# Patient Record
Sex: FEMALE | Race: WHITE | Employment: OTHER | URBAN - METROPOLITAN AREA
[De-identification: names, ages, dates, MRNs, and addresses within clinical notes are randomized per-mention and may not be internally consistent; named-entity substitution may affect disease eponyms.]

---

## 2017-11-16 ENCOUNTER — OFFICE VISIT (OUTPATIENT)
Dept: DERMATOLOGY | Age: 62
End: 2017-11-16

## 2017-11-16 DIAGNOSIS — L56.5 DISSEMINATED SUPERFICIAL ACTINIC POROKERATOSIS (DSAP): ICD-10-CM

## 2017-11-16 DIAGNOSIS — D22.9 MULTIPLE NEVI: Primary | ICD-10-CM

## 2017-11-16 PROCEDURE — 99213 OFFICE O/P EST LOW 20 MIN: CPT | Performed by: DERMATOLOGY

## 2017-11-16 RX ORDER — LANOLIN ALCOHOL/MO/W.PET/CERES
3 CREAM (GRAM) TOPICAL NIGHTLY
COMMUNITY
End: 2020-06-22

## 2017-11-16 NOTE — PROGRESS NOTES
[facility] Dermatology  Laura Ruiz MD  [address] [facility]  1955    60 y.o. female     Date of Visit: 2017    Chief Complaint: skin moles    History of Present Illness:    1. She has multiple nevi - not aware of any changes in size, color or shape. 2.  F/u for disseminated superficial actinic porokeratosis of the legs - remains stable and asymptomatic. Review of Systems:  Skin: no rash or itching. Past Medical History, Family History, Surgical History, Medications and Allergies reviewed. Past Medical History:   Diagnosis Date    Hypertension      Past Surgical History:   Procedure Laterality Date    APPENDECTOMY         Allergies   Allergen Reactions    Penicillins     Sulfa Antibiotics      Outpatient Prescriptions Marked as Taking for the 17 encounter (Office Visit) with Mary Newman MD   Medication Sig Dispense Refill    melatonin 3 MG TABS tablet Take 3 mg by mouth daily      Probiotic Product (PROBIOTIC PO) Take by mouth      Milk Thistle 1000 MG CAPS Take by mouth      lisinopril (PRINIVIL;ZESTRIL) 10 MG tablet       Multiple Vitamins-Minerals (THERAPEUTIC MULTIVITAMIN-MINERALS) tablet Take 1 tablet by mouth daily.  Cholecalciferol (VITAMIN D PO) Take  by mouth.  calcium carbonate (OSCAL) 500 MG TABS tablet Take 500 mg by mouth daily.  Omega-3 Fatty Acids (FISH OIL PO) Take  by mouth. Physical Examination       The following were examined and determined to be normal: Psych/Neuro, Scalp/hair, Head/face, Conjunctivae/eyelids, Gums/teeth/lips, Neck, Breast/axilla/chest, Abdomen, Back, RUE, LUE and Nails/digits. The following were examined and determined to be abnormal: RLE and LLE. Well appearing. 1.  Trunk and extremities with multiple well defined round to oval smooth brown macules and papules. 2.  Legs with round pink macules with a thin keratotic rim. Assessment and Plan     1. Multiple nevi - benign appearing    She was encouraged to perform monthly self skin exams and to call with any new or concerning lesions. Sun protective behaviors were encouraged. Follow up for full skin exam in 1 year. 2. Disseminated superficial actinic porokeratosis (DSAP) - stable    Encouraged sun protective behaviors. Return in about 1 year (around 11/16/2018).

## 2019-01-03 ENCOUNTER — OFFICE VISIT (OUTPATIENT)
Dept: DERMATOLOGY | Age: 64
End: 2019-01-03
Payer: COMMERCIAL

## 2019-01-03 DIAGNOSIS — L56.5 DISSEMINATED SUPERFICIAL ACTINIC POROKERATOSIS (DSAP): ICD-10-CM

## 2019-01-03 DIAGNOSIS — D22.9 MULTIPLE BENIGN NEVI: Primary | ICD-10-CM

## 2019-01-03 DIAGNOSIS — L70.9 ADULT ACNE: ICD-10-CM

## 2019-01-03 PROCEDURE — 99213 OFFICE O/P EST LOW 20 MIN: CPT | Performed by: DERMATOLOGY

## 2019-01-03 RX ORDER — CLINDAMYCIN PHOSPHATE AND BENZOYL PEROXIDE 10; 50 MG/G; MG/G
GEL TOPICAL
Qty: 45 G | Refills: 3 | Status: SHIPPED | OUTPATIENT
Start: 2019-01-03 | End: 2019-10-07

## 2019-10-07 ENCOUNTER — OFFICE VISIT (OUTPATIENT)
Dept: DERMATOLOGY | Age: 64
End: 2019-10-07
Payer: MEDICARE

## 2019-10-07 DIAGNOSIS — L81.4 SOLAR LENTIGO: ICD-10-CM

## 2019-10-07 DIAGNOSIS — D48.5 NEOPLASM OF UNCERTAIN BEHAVIOR OF SKIN: ICD-10-CM

## 2019-10-07 DIAGNOSIS — D22.9 MULTIPLE NEVI: Primary | ICD-10-CM

## 2019-10-07 PROCEDURE — 4040F PNEUMOC VAC/ADMIN/RCVD: CPT | Performed by: DERMATOLOGY

## 2019-10-07 PROCEDURE — G8421 BMI NOT CALCULATED: HCPCS | Performed by: DERMATOLOGY

## 2019-10-07 PROCEDURE — 11102 TANGNTL BX SKIN SINGLE LES: CPT | Performed by: DERMATOLOGY

## 2019-10-07 PROCEDURE — 1123F ACP DISCUSS/DSCN MKR DOCD: CPT | Performed by: DERMATOLOGY

## 2019-10-07 PROCEDURE — 3017F COLORECTAL CA SCREEN DOC REV: CPT | Performed by: DERMATOLOGY

## 2019-10-07 PROCEDURE — 99213 OFFICE O/P EST LOW 20 MIN: CPT | Performed by: DERMATOLOGY

## 2019-10-07 PROCEDURE — G8484 FLU IMMUNIZE NO ADMIN: HCPCS | Performed by: DERMATOLOGY

## 2019-10-07 PROCEDURE — 1090F PRES/ABSN URINE INCON ASSESS: CPT | Performed by: DERMATOLOGY

## 2019-10-07 PROCEDURE — G8400 PT W/DXA NO RESULTS DOC: HCPCS | Performed by: DERMATOLOGY

## 2019-10-07 PROCEDURE — G8427 DOCREV CUR MEDS BY ELIG CLIN: HCPCS | Performed by: DERMATOLOGY

## 2019-10-07 PROCEDURE — 1036F TOBACCO NON-USER: CPT | Performed by: DERMATOLOGY

## 2019-10-09 LAB — DERMATOLOGY PATHOLOGY REPORT: NORMAL

## 2020-06-22 ENCOUNTER — ANESTHESIA EVENT (OUTPATIENT)
Dept: OPERATING ROOM | Age: 65
End: 2020-06-22
Payer: MEDICARE

## 2020-06-22 ENCOUNTER — OFFICE VISIT (OUTPATIENT)
Dept: ORTHOPEDIC SURGERY | Age: 65
End: 2020-06-22
Payer: MEDICARE

## 2020-06-22 VITALS — BODY MASS INDEX: 22.86 KG/M2 | WEIGHT: 129 LBS | HEIGHT: 63 IN

## 2020-06-22 PROCEDURE — G8420 CALC BMI NORM PARAMETERS: HCPCS | Performed by: ORTHOPAEDIC SURGERY

## 2020-06-22 PROCEDURE — 99203 OFFICE O/P NEW LOW 30 MIN: CPT | Performed by: ORTHOPAEDIC SURGERY

## 2020-06-22 PROCEDURE — 1036F TOBACCO NON-USER: CPT | Performed by: ORTHOPAEDIC SURGERY

## 2020-06-22 PROCEDURE — 1090F PRES/ABSN URINE INCON ASSESS: CPT | Performed by: ORTHOPAEDIC SURGERY

## 2020-06-22 PROCEDURE — G8400 PT W/DXA NO RESULTS DOC: HCPCS | Performed by: ORTHOPAEDIC SURGERY

## 2020-06-22 PROCEDURE — G8427 DOCREV CUR MEDS BY ELIG CLIN: HCPCS | Performed by: ORTHOPAEDIC SURGERY

## 2020-06-22 PROCEDURE — 1123F ACP DISCUSS/DSCN MKR DOCD: CPT | Performed by: ORTHOPAEDIC SURGERY

## 2020-06-22 PROCEDURE — 4040F PNEUMOC VAC/ADMIN/RCVD: CPT | Performed by: ORTHOPAEDIC SURGERY

## 2020-06-22 PROCEDURE — 3017F COLORECTAL CA SCREEN DOC REV: CPT | Performed by: ORTHOPAEDIC SURGERY

## 2020-06-22 RX ORDER — LISINOPRIL AND HYDROCHLOROTHIAZIDE 20; 12.5 MG/1; MG/1
1 TABLET ORAL DAILY
COMMUNITY

## 2020-06-22 RX ORDER — PHENOL 1.4 %
AEROSOL, SPRAY (ML) MUCOUS MEMBRANE NIGHTLY
COMMUNITY

## 2020-06-22 RX ORDER — OXYCODONE HYDROCHLORIDE AND ACETAMINOPHEN 5; 325 MG/1; MG/1
1 TABLET ORAL EVERY 6 HOURS PRN
Qty: 28 TABLET | Refills: 0 | Status: SHIPPED | OUTPATIENT
Start: 2020-06-22 | End: 2020-06-29

## 2020-06-22 NOTE — PROGRESS NOTES
500 mg by mouth daily.  Omega-3 Fatty Acids (FISH OIL PO) Take  by mouth. No current facility-administered medications on file prior to visit. Past Medical History:   Diagnosis Date    Hypertension      Allergies   Allergen Reactions    Penicillins     Sulfa Antibiotics      Social History     Socioeconomic History    Marital status: Single     Spouse name: Not on file    Number of children: Not on file    Years of education: Not on file    Highest education level: Not on file   Occupational History    Not on file   Social Needs    Financial resource strain: Not on file    Food insecurity     Worry: Not on file     Inability: Not on file    Transportation needs     Medical: Not on file     Non-medical: Not on file   Tobacco Use    Smoking status: Never Smoker    Smokeless tobacco: Never Used   Substance and Sexual Activity    Alcohol use: Yes     Comment: social    Drug use: No    Sexual activity: Not on file   Lifestyle    Physical activity     Days per week: Not on file     Minutes per session: Not on file    Stress: Not on file   Relationships    Social connections     Talks on phone: Not on file     Gets together: Not on file     Attends Spiritism service: Not on file     Active member of club or organization: Not on file     Attends meetings of clubs or organizations: Not on file     Relationship status: Not on file    Intimate partner violence     Fear of current or ex partner: Not on file     Emotionally abused: Not on file     Physically abused: Not on file     Forced sexual activity: Not on file   Other Topics Concern    Not on file   Social History Narrative    Not on file     No family history on file. Patient's medications, allergies, past medical, surgical, social and family histories were reviewed and updated as appropriate.     Review of Systems  Pertinent items are noted in HPI  Denies fever chills, confusion and bowel and bladder active change  Complete Review

## 2020-06-22 NOTE — PROGRESS NOTES
Delmi Marjorie    Age 76 y.o.    female    1955    [facility] [id number]    6/23/2020  Arrival Time_____________  OR Time____________[address]     Procedure(s):  OPEN REDUCTION INTERNAL FIXATION LEFT DISTAL RADIUS FRACTURE -BLOCK-  LEFT OPEN CARPAL TUNNEL RELEASE                      General    Surgeon(s):  Josselin Cronin MD       Phone [phone] ([place])     [address]  Cell Work  _________________________________________________________________  _________________________________________________________________  _________________________________________________________________  _________________________________________________________________  _________________________________________________________________      PCP _____________________________ Phone_________________       H&P__________________Bringing    Chart            Epic  DOS     Called_______  EKG__________________Bringing    Chart            Epic  DOS     Called_______  LAB__________________ Bringing    Chart            Epic  DOS     Called_______  Cardiac Clearance_______Bringing    Chart            Epic      DOS       Called_______    Cardiologist________________________ Phone___________________________      ? Taoist concerns / Waiver on Chart            PAT Communications_____________  ? Pre-op Instructions Given [place]          ______________________________  ? Directions to Surgery Center                          ______________________________  ? Transportation Home_______________      _______________________________  ?  Crutches/Walker__________________        _______________________________      ________Pre-op Orders   _______Transcribed    _______Wt.  ________Pharmacy          _______SCD  ______VTE     ______Beta Blocker  ________Consent             ________TED Audelia Piper

## 2020-06-23 ENCOUNTER — ANESTHESIA (OUTPATIENT)
Dept: OPERATING ROOM | Age: 65
End: 2020-06-23
Payer: MEDICARE

## 2020-06-23 ENCOUNTER — HOSPITAL ENCOUNTER (OUTPATIENT)
Age: 65
Setting detail: OUTPATIENT SURGERY
Discharge: HOME | End: 2020-06-23
Attending: ORTHOPAEDIC SURGERY | Admitting: ORTHOPAEDIC SURGERY
Payer: MEDICARE

## 2020-06-23 VITALS
DIASTOLIC BLOOD PRESSURE: 74 MMHG | SYSTOLIC BLOOD PRESSURE: 118 MMHG | RESPIRATION RATE: 5 BRPM | OXYGEN SATURATION: 98 %

## 2020-06-23 VITALS
HEIGHT: 63 IN | TEMPERATURE: 97 F | OXYGEN SATURATION: 99 % | DIASTOLIC BLOOD PRESSURE: 80 MMHG | WEIGHT: 129 LBS | HEART RATE: 60 BPM | BODY MASS INDEX: 22.86 KG/M2 | SYSTOLIC BLOOD PRESSURE: 146 MMHG | RESPIRATION RATE: 16 BRPM

## 2020-06-23 PROBLEM — G56.02 LEFT CARPAL TUNNEL SYNDROME: Status: ACTIVE | Noted: 2020-06-23

## 2020-06-23 PROBLEM — S52.552A CLOSED EXTRAARTICULAR FRACTURE OF DISTAL RADIUS, LEFT, INITIAL ENCOUNTER: Status: ACTIVE | Noted: 2020-06-23

## 2020-06-23 PROCEDURE — 7100000000 HC PACU RECOVERY - FIRST 15 MIN: Performed by: ORTHOPAEDIC SURGERY

## 2020-06-23 PROCEDURE — 3700000001 HC ADD 15 MINUTES (ANESTHESIA): Performed by: ORTHOPAEDIC SURGERY

## 2020-06-23 PROCEDURE — 2580000003 HC RX 258: Performed by: ORTHOPAEDIC SURGERY

## 2020-06-23 PROCEDURE — 2580000003 HC RX 258: Performed by: ANESTHESIOLOGY

## 2020-06-23 PROCEDURE — 3600000014 HC SURGERY LEVEL 4 ADDTL 15MIN: Performed by: ORTHOPAEDIC SURGERY

## 2020-06-23 PROCEDURE — 6360000002 HC RX W HCPCS: Performed by: ANESTHESIOLOGY

## 2020-06-23 PROCEDURE — 6360000002 HC RX W HCPCS: Performed by: NURSE ANESTHETIST, CERTIFIED REGISTERED

## 2020-06-23 PROCEDURE — 2709999900 HC NON-CHARGEABLE SUPPLY: Performed by: ORTHOPAEDIC SURGERY

## 2020-06-23 PROCEDURE — 64415 NJX AA&/STRD BRCH PLXS IMG: CPT | Performed by: ANESTHESIOLOGY

## 2020-06-23 PROCEDURE — 7100000010 HC PHASE II RECOVERY - FIRST 15 MIN: Performed by: ORTHOPAEDIC SURGERY

## 2020-06-23 PROCEDURE — 2500000003 HC RX 250 WO HCPCS: Performed by: ANESTHESIOLOGY

## 2020-06-23 PROCEDURE — 3600000004 HC SURGERY LEVEL 4 BASE: Performed by: ORTHOPAEDIC SURGERY

## 2020-06-23 PROCEDURE — 6370000000 HC RX 637 (ALT 250 FOR IP): Performed by: ANESTHESIOLOGY

## 2020-06-23 PROCEDURE — 2500000003 HC RX 250 WO HCPCS: Performed by: ORTHOPAEDIC SURGERY

## 2020-06-23 PROCEDURE — 3700000000 HC ANESTHESIA ATTENDED CARE: Performed by: ORTHOPAEDIC SURGERY

## 2020-06-23 PROCEDURE — 7100000001 HC PACU RECOVERY - ADDTL 15 MIN: Performed by: ORTHOPAEDIC SURGERY

## 2020-06-23 PROCEDURE — C1713 ANCHOR/SCREW BN/BN,TIS/BN: HCPCS | Performed by: ORTHOPAEDIC SURGERY

## 2020-06-23 PROCEDURE — 7100000011 HC PHASE II RECOVERY - ADDTL 15 MIN: Performed by: ORTHOPAEDIC SURGERY

## 2020-06-23 PROCEDURE — 2500000003 HC RX 250 WO HCPCS: Performed by: NURSE ANESTHETIST, CERTIFIED REGISTERED

## 2020-06-23 DEVICE — PLATE BNE W22XL51MM 12 H L DST RAD TI LOK COMPR LO PROF NAR: Type: IMPLANTABLE DEVICE | Site: WRIST | Status: FUNCTIONAL

## 2020-06-23 DEVICE — K WIRE FIX DIA1.6MM S STL FOR DST VOLAR PLATING SYS: Type: IMPLANTABLE DEVICE | Status: FUNCTIONAL

## 2020-06-23 DEVICE — SCREW BNE L13MM DIA2.7MM DST RAD NONLOCKING FULL THRD SQ: Type: IMPLANTABLE DEVICE | Site: WRIST | Status: FUNCTIONAL

## 2020-06-23 DEVICE — SCREW BNE L14MM DIA2.7MM DST VOLAR RAD NONLOCKING FULL THRD: Type: IMPLANTABLE DEVICE | Site: WRIST | Status: FUNCTIONAL

## 2020-06-23 DEVICE — SCREW BNE L20MM DIA2.7MM DST RAD LOK FULL THRD SQ DRV HD LO: Type: IMPLANTABLE DEVICE | Site: WRIST | Status: FUNCTIONAL

## 2020-06-23 DEVICE — PEG BNE FIX L16MM DIA2.2MM DST VOLAR RAD SMOOTH LOK FOR DVR: Type: IMPLANTABLE DEVICE | Site: WRIST | Status: FUNCTIONAL

## 2020-06-23 DEVICE — SCREW BNE L20MM DIA2.7MM DST VOLAR RAD MULTDIR FULL THRD SQ: Type: IMPLANTABLE DEVICE | Status: FUNCTIONAL

## 2020-06-23 RX ORDER — PROMETHAZINE HYDROCHLORIDE 25 MG/ML
6.25 INJECTION, SOLUTION INTRAMUSCULAR; INTRAVENOUS
Status: DISCONTINUED | OUTPATIENT
Start: 2020-06-23 | End: 2020-06-23 | Stop reason: HOSPADM

## 2020-06-23 RX ORDER — MIDAZOLAM HYDROCHLORIDE 1 MG/ML
INJECTION INTRAMUSCULAR; INTRAVENOUS PRN
Status: DISCONTINUED | OUTPATIENT
Start: 2020-06-23 | End: 2020-06-23 | Stop reason: SDUPTHER

## 2020-06-23 RX ORDER — FENTANYL CITRATE 50 UG/ML
INJECTION, SOLUTION INTRAMUSCULAR; INTRAVENOUS
Status: COMPLETED
Start: 2020-06-23 | End: 2020-06-23

## 2020-06-23 RX ORDER — PROPOFOL 10 MG/ML
INJECTION, EMULSION INTRAVENOUS PRN
Status: DISCONTINUED | OUTPATIENT
Start: 2020-06-23 | End: 2020-06-23 | Stop reason: SDUPTHER

## 2020-06-23 RX ORDER — FENTANYL CITRATE 50 UG/ML
25 INJECTION, SOLUTION INTRAMUSCULAR; INTRAVENOUS EVERY 5 MIN PRN
Status: DISCONTINUED | OUTPATIENT
Start: 2020-06-23 | End: 2020-06-23 | Stop reason: HOSPADM

## 2020-06-23 RX ORDER — SODIUM CHLORIDE, SODIUM LACTATE, POTASSIUM CHLORIDE, CALCIUM CHLORIDE 600; 310; 30; 20 MG/100ML; MG/100ML; MG/100ML; MG/100ML
INJECTION, SOLUTION INTRAVENOUS CONTINUOUS
Status: DISCONTINUED | OUTPATIENT
Start: 2020-06-23 | End: 2020-06-23 | Stop reason: HOSPADM

## 2020-06-23 RX ORDER — MAGNESIUM HYDROXIDE 1200 MG/15ML
LIQUID ORAL CONTINUOUS PRN
Status: COMPLETED | OUTPATIENT
Start: 2020-06-23 | End: 2020-06-23

## 2020-06-23 RX ORDER — MEPERIDINE HYDROCHLORIDE 50 MG/ML
12.5 INJECTION INTRAMUSCULAR; INTRAVENOUS; SUBCUTANEOUS EVERY 5 MIN PRN
Status: DISCONTINUED | OUTPATIENT
Start: 2020-06-23 | End: 2020-06-23 | Stop reason: HOSPADM

## 2020-06-23 RX ORDER — BUPIVACAINE HYDROCHLORIDE AND EPINEPHRINE 5; 5 MG/ML; UG/ML
INJECTION, SOLUTION EPIDURAL; INTRACAUDAL; PERINEURAL PRN
Status: DISCONTINUED | OUTPATIENT
Start: 2020-06-23 | End: 2020-06-23 | Stop reason: SDUPTHER

## 2020-06-23 RX ORDER — OXYCODONE HYDROCHLORIDE AND ACETAMINOPHEN 5; 325 MG/1; MG/1
2 TABLET ORAL PRN
Status: COMPLETED | OUTPATIENT
Start: 2020-06-23 | End: 2020-06-23

## 2020-06-23 RX ORDER — SODIUM CHLORIDE 0.9 % (FLUSH) 0.9 %
10 SYRINGE (ML) INJECTION PRN
Status: DISCONTINUED | OUTPATIENT
Start: 2020-06-23 | End: 2020-06-23 | Stop reason: HOSPADM

## 2020-06-23 RX ORDER — HYDRALAZINE HYDROCHLORIDE 20 MG/ML
5 INJECTION INTRAMUSCULAR; INTRAVENOUS EVERY 10 MIN PRN
Status: DISCONTINUED | OUTPATIENT
Start: 2020-06-23 | End: 2020-06-23 | Stop reason: HOSPADM

## 2020-06-23 RX ORDER — OXYCODONE HYDROCHLORIDE AND ACETAMINOPHEN 5; 325 MG/1; MG/1
1 TABLET ORAL PRN
Status: COMPLETED | OUTPATIENT
Start: 2020-06-23 | End: 2020-06-23

## 2020-06-23 RX ORDER — ONDANSETRON 2 MG/ML
4 INJECTION INTRAMUSCULAR; INTRAVENOUS
Status: DISCONTINUED | OUTPATIENT
Start: 2020-06-23 | End: 2020-06-23 | Stop reason: HOSPADM

## 2020-06-23 RX ORDER — SODIUM CHLORIDE 0.9 % (FLUSH) 0.9 %
10 SYRINGE (ML) INJECTION EVERY 12 HOURS SCHEDULED
Status: DISCONTINUED | OUTPATIENT
Start: 2020-06-23 | End: 2020-06-23 | Stop reason: HOSPADM

## 2020-06-23 RX ORDER — FENTANYL CITRATE 50 UG/ML
INJECTION, SOLUTION INTRAMUSCULAR; INTRAVENOUS PRN
Status: DISCONTINUED | OUTPATIENT
Start: 2020-06-23 | End: 2020-06-23 | Stop reason: SDUPTHER

## 2020-06-23 RX ORDER — MIDAZOLAM HYDROCHLORIDE 1 MG/ML
INJECTION INTRAMUSCULAR; INTRAVENOUS
Status: COMPLETED
Start: 2020-06-23 | End: 2020-06-23

## 2020-06-23 RX ORDER — LIDOCAINE HYDROCHLORIDE 20 MG/ML
INJECTION, SOLUTION EPIDURAL; INFILTRATION; INTRACAUDAL; PERINEURAL PRN
Status: DISCONTINUED | OUTPATIENT
Start: 2020-06-23 | End: 2020-06-23 | Stop reason: SDUPTHER

## 2020-06-23 RX ORDER — ONDANSETRON 2 MG/ML
INJECTION INTRAMUSCULAR; INTRAVENOUS PRN
Status: DISCONTINUED | OUTPATIENT
Start: 2020-06-23 | End: 2020-06-23 | Stop reason: SDUPTHER

## 2020-06-23 RX ORDER — DEXAMETHASONE SODIUM PHOSPHATE 10 MG/ML
INJECTION INTRAMUSCULAR; INTRAVENOUS PRN
Status: DISCONTINUED | OUTPATIENT
Start: 2020-06-23 | End: 2020-06-23 | Stop reason: SDUPTHER

## 2020-06-23 RX ADMIN — PROPOFOL 200 MG: 10 INJECTION, EMULSION INTRAVENOUS at 11:47

## 2020-06-23 RX ADMIN — HYDROMORPHONE HYDROCHLORIDE 0.25 MG: 1 INJECTION, SOLUTION INTRAMUSCULAR; INTRAVENOUS; SUBCUTANEOUS at 13:29

## 2020-06-23 RX ADMIN — FENTANYL CITRATE 100 MCG: 50 INJECTION INTRAMUSCULAR; INTRAVENOUS at 09:30

## 2020-06-23 RX ADMIN — FENTANYL CITRATE 25 MCG: 50 INJECTION INTRAMUSCULAR; INTRAVENOUS at 12:54

## 2020-06-23 RX ADMIN — BUPIVACAINE HYDROCHLORIDE AND EPINEPHRINE 10 ML: 5; 5 INJECTION, SOLUTION EPIDURAL; INTRACAUDAL; PERINEURAL at 09:32

## 2020-06-23 RX ADMIN — OXYCODONE HYDROCHLORIDE AND ACETAMINOPHEN 1 TABLET: 5; 325 TABLET ORAL at 13:37

## 2020-06-23 RX ADMIN — SODIUM CHLORIDE, POTASSIUM CHLORIDE, SODIUM LACTATE AND CALCIUM CHLORIDE: 600; 310; 30; 20 INJECTION, SOLUTION INTRAVENOUS at 12:20

## 2020-06-23 RX ADMIN — HYDROMORPHONE HYDROCHLORIDE 0.25 MG: 1 INJECTION, SOLUTION INTRAMUSCULAR; INTRAVENOUS; SUBCUTANEOUS at 13:36

## 2020-06-23 RX ADMIN — LIDOCAINE HYDROCHLORIDE 50 MG: 20 INJECTION, SOLUTION EPIDURAL; INFILTRATION; INTRACAUDAL; PERINEURAL at 11:47

## 2020-06-23 RX ADMIN — SODIUM CHLORIDE, POTASSIUM CHLORIDE, SODIUM LACTATE AND CALCIUM CHLORIDE: 600; 310; 30; 20 INJECTION, SOLUTION INTRAVENOUS at 09:26

## 2020-06-23 RX ADMIN — BUPIVACAINE HYDROCHLORIDE AND EPINEPHRINE 10 ML: 5; 5 INJECTION, SOLUTION EPIDURAL; INTRACAUDAL; PERINEURAL at 09:34

## 2020-06-23 RX ADMIN — DEXAMETHASONE SODIUM PHOSPHATE 10 MG: 10 INJECTION INTRAMUSCULAR; INTRAVENOUS at 12:01

## 2020-06-23 RX ADMIN — FENTANYL CITRATE 50 MCG: 50 INJECTION INTRAMUSCULAR; INTRAVENOUS at 11:47

## 2020-06-23 RX ADMIN — BUPIVACAINE HYDROCHLORIDE AND EPINEPHRINE 10 ML: 5; 5 INJECTION, SOLUTION EPIDURAL; INTRACAUDAL; PERINEURAL at 09:33

## 2020-06-23 RX ADMIN — MIDAZOLAM HYDROCHLORIDE 2 MG: 2 INJECTION, SOLUTION INTRAMUSCULAR; INTRAVENOUS at 09:30

## 2020-06-23 RX ADMIN — FENTANYL CITRATE 25 MCG: 50 INJECTION INTRAMUSCULAR; INTRAVENOUS at 13:00

## 2020-06-23 RX ADMIN — Medication 900 MG: at 11:44

## 2020-06-23 RX ADMIN — ONDANSETRON 4 MG: 2 INJECTION INTRAMUSCULAR; INTRAVENOUS at 12:01

## 2020-06-23 ASSESSMENT — PULMONARY FUNCTION TESTS
PIF_VALUE: 2
PIF_VALUE: 3
PIF_VALUE: 2
PIF_VALUE: 2
PIF_VALUE: 3
PIF_VALUE: 2
PIF_VALUE: 1
PIF_VALUE: 2
PIF_VALUE: 0
PIF_VALUE: 2
PIF_VALUE: 13
PIF_VALUE: 3
PIF_VALUE: 2
PIF_VALUE: 2
PIF_VALUE: 17
PIF_VALUE: 18
PIF_VALUE: 2
PIF_VALUE: 0
PIF_VALUE: 2
PIF_VALUE: 3
PIF_VALUE: 2
PIF_VALUE: 2
PIF_VALUE: 0
PIF_VALUE: 1
PIF_VALUE: 2
PIF_VALUE: 3
PIF_VALUE: 3
PIF_VALUE: 2
PIF_VALUE: 3
PIF_VALUE: 2
PIF_VALUE: 1
PIF_VALUE: 2
PIF_VALUE: 3
PIF_VALUE: 2
PIF_VALUE: 6
PIF_VALUE: 2
PIF_VALUE: 3
PIF_VALUE: 3
PIF_VALUE: 2
PIF_VALUE: 13
PIF_VALUE: 2
PIF_VALUE: 3
PIF_VALUE: 2
PIF_VALUE: 2
PIF_VALUE: 0
PIF_VALUE: 0
PIF_VALUE: 2

## 2020-06-23 ASSESSMENT — PAIN SCALES - GENERAL
PAINLEVEL_OUTOF10: 8

## 2020-06-23 ASSESSMENT — PAIN - FUNCTIONAL ASSESSMENT: PAIN_FUNCTIONAL_ASSESSMENT: 0-10

## 2020-06-23 ASSESSMENT — LIFESTYLE VARIABLES: SMOKING_STATUS: 0

## 2020-06-23 NOTE — ANESTHESIA PROCEDURE NOTES
Peripheral Block    Patient location during procedure: pre-op  Start time: 6/23/2020 9:30 AM  End time: 6/23/2020 9:35 AM  Staffing  Anesthesiologist: Yusuf Stapleton MD  Performed: anesthesiologist   Preanesthetic Checklist  Completed: patient identified, site marked, surgical consent, pre-op evaluation, timeout performed, IV checked, risks and benefits discussed, monitors and equipment checked, anesthesia consent given, oxygen available and patient being monitored  Peripheral Block  Patient position: sitting  Prep: ChloraPrep  Patient monitoring: continuous pulse ox and IV access  Block type: Brachial plexus  Laterality: left  Injection technique: single-shot  Procedures: ultrasound guided  Supraclavicular  Provider prep: sterile gloves and mask  Needle  Needle gauge: 22 G  Needle length: 5 cm  Needle localization: ultrasound guidance  Test dose: negative  Assessment  Injection assessment: no paresthesia on injection, local visualized surrounding nerve on ultrasound and negative aspiration for heme  Paresthesia pain: none  Slow fractionated injection: yes  Hemodynamics: stable  Additional Notes  Pt. agrees to risks, benefits and alternatives to block  Block performed at the request of the surgeon for post-operative pain management   Immediately prior to procedure a \"time out\" was called to verify the correct patient, procedure, equipment, support staff. Site/side marked as required  Side: left  Site/Approach: Lateral neck in the IS Groove at the C5/6 Level  Position: Semi-Fowlers/Sitting  Sedation: Midazolam 2 mg  +  Fentanyl  100  mcg  IV  Local Anesthetic Dose:  1% Lido  4 ml  Aseptic technique: prepped with chlorhexidine  Ultrasound:   yes, see attached image  Local Anesthetic: 0.5% Bupivacaine with Epi 1:200K  Amount: 30 ml  in 5 ml increments after negative aspiration each time. Easy injection w/o resistance and w/o pain/paresthesias. Pt tolerated procedure well. No complications.   Reason for block:

## 2020-06-24 NOTE — OP NOTE
[facility]                                OPERATIVE REPORT    PATIENT NAME: Lily Soliman                      :        1955  MED REC NO:   [id number]                          ROOM:  ACCOUNT NO:   [de-identified]                           ADMIT DATE: 2020  PROVIDER:     Jovanny Pool MD    DATE OF PROCEDURE:  2020    PREOPERATIVE DIAGNOSIS:  Severely comminuted seven and a half week old  impacted left distal radius fracture. POSTOPERATIVE DIAGNOSIS:  Severely comminuted seven and a half week old  impacted left distal radius fracture. OPERATION PERFORMED:  1. Open reduction and internal fixation of three-part interarticular  distal radius fracture. 2.  Left carpal tunnel release. 3.  Interpretation intraoperative PA, lateral, and oblique x-rays of the  wrist.    SURGEON:  Jovanny Pool MD    ANESTHESIA:  General with nerve block. INDICATIONS FOR PROCEDURE:  The patient was out of town and fractured  her left distal radius. She subsequently returned to [facility], came to my  office yesterday and had about 30 degrees of dorsal angulatory deformity  and significant shortening and loss of radial inclination of a  comminuted intraarticular distal radius fracture. We discussed the  options of letting this fully heal in this position versus attempting an  open reduction and internal fixation with concomitant carpal tunnel  release. We elected to try to get better alignment of this fracture. ESTIMATED BLOOD LOSS:  100 mL. DESCRIPTION OF PROCEDURE:  The patient was placed on the operative table  in supine position and general anesthetic was placed. She has also  previously been given a nerve block by Anesthesia in the preop holding  area. The arm was then prepped with ChloraPrep and draped in a sterile  fashion.   The arm was exsanguinated using an Esmarch bandage and a  well-padded tourniquet inflated about the upper arm to 250 mmHg. A curvilinear incision was first made in the palm, paralleling thenar  crease, but ulnar to the palmaris longus tendon. Dissection was carried  down through the palmar aponeurosis and transverse carpal ligament was  completely divided making sure we had good proximal and distal division. Attention was then turned to the distal radius fracture. A longitudinal  incision was made over flexor carpi radialis and dissection was carried  down retracting flexor carpi radialis and radial artery to the radial  side contents of the carpal tunnel to the ulnar side and then the  pronator quadratus was opened and reflected from the volar aspect of the  radius. Extensive dissection was necessary to release the radial  styloid fragments and we had to use a small osteotome in the fracture  site to open the partially healed volar fracture. Hypertrophic bone was  removed using the rongeur. Using combination of longitudinal traction  and levering with a freer elevator, we were able to break loose the  dorsal cortex enough to get reasonable reduction of the fracture. A  0.062 K-wires were placed in both the lunate fossa fragment and the  radial styloid fragment, but the radial styloid fragment was comminuted  as well. We were able to lever these up into reasonable position and  the [employer] volar plate was placed with the oblong screw hole  proximally. K-wires were then placed through the screw holes distally. We had good reduction of the ulnar column, but had difficulty getting  the radial column back up to the ulnar column, so went ahead and fixed  the plate in position and attached the lunate fossa fragments. We then  using a combination again of longitudinal traction and a joystick 0.062  K-wire in the radial styloid fragment, we were able to elevate it and  cross pin it to the shaft of the radius.   We then filled the screw holes  distally using a

## 2020-07-02 ENCOUNTER — OFFICE VISIT (OUTPATIENT)
Dept: ORTHOPEDIC SURGERY | Age: 65
End: 2020-07-02

## 2020-07-02 ENCOUNTER — HOSPITAL ENCOUNTER (OUTPATIENT)
Dept: OCCUPATIONAL THERAPY | Age: 65
Setting detail: THERAPIES SERIES
Discharge: HOME | End: 2020-07-02
Payer: MEDICARE

## 2020-07-02 ENCOUNTER — NURSE ONLY (OUTPATIENT)
Dept: PRIMARY CARE CLINIC | Age: 65
End: 2020-07-02
Payer: MEDICARE

## 2020-07-02 PROCEDURE — 99211 OFF/OP EST MAY X REQ PHY/QHP: CPT | Performed by: NURSE PRACTITIONER

## 2020-07-02 PROCEDURE — L3906 WHO W/O JOINTS CF: HCPCS | Performed by: OCCUPATIONAL THERAPIST

## 2020-07-02 PROCEDURE — 97165 OT EVAL LOW COMPLEX 30 MIN: CPT | Performed by: OCCUPATIONAL THERAPIST

## 2020-07-02 PROCEDURE — 99024 POSTOP FOLLOW-UP VISIT: CPT | Performed by: ORTHOPAEDIC SURGERY

## 2020-07-02 PROCEDURE — 97110 THERAPEUTIC EXERCISES: CPT | Performed by: OCCUPATIONAL THERAPIST

## 2020-07-02 NOTE — PROGRESS NOTES
Assessment: First postop visit after ORIF of distal radius fracture which was over 11weeks old when we did the surgery. Treatment Plan: She is still quite swollen due to the dissection at the 5-week alley from  fracture. We will leave her sutures in another week bring her back again in a week with repeat x-rays but we will go ahead and start her in therapy    Return in about 1 week (around 7/9/2020) for X-ray next visit. Chief Complaint:  Post-Op Check (PO 06/23/20 ORIF Left distal radius fracture; Left open CTR)      History of Present Illness  Darnell Skelton is a 76 y.o. female. Location: left wrist  Severity: experiencing some pain and swelling Duration: 06/23/20 surgery  Modifying factors: splint, elevation  Associated symptoms: none    Medical History    Current Outpatient Medications on File Prior to Visit   Medication Sig Dispense Refill    lisinopril-hydroCHLOROthiazide (PRINZIDE;ZESTORETIC) 20-12.5 MG per tablet Take 1 tablet by mouth daily      Melatonin 10 MG TABS Take by mouth nightly      CALCIUM-MAGNESIUM PO Take by mouth daily      PSYLLIUM HUSK PO Take by mouth daily      Probiotic Product (PROBIOTIC PO) Take by mouth daily       Milk Thistle 1000 MG CAPS Take by mouth daily       Multiple Vitamins-Minerals (THERAPEUTIC MULTIVITAMIN-MINERALS) tablet Take 1 tablet by mouth daily.  Cholecalciferol (VITAMIN D PO) Take by mouth daily       Omega-3 Fatty Acids (FISH OIL PO) Take by mouth daily        No current facility-administered medications on file prior to visit.       Past Medical History:   Diagnosis Date    Hypertension      Allergies   Allergen Reactions    Penicillins     Sulfa Antibiotics      Social History     Socioeconomic History    Marital status: Single     Spouse name: Not on file    Number of children: Not on file    Years of education: Not on file    Highest education level: Not on file   Occupational History    Not on file   Social Needs    Financial resource strain: Not on file    Food insecurity     Worry: Not on file     Inability: Not on file    Transportation needs     Medical: Not on file     Non-medical: Not on file   Tobacco Use    Smoking status: Never Smoker    Smokeless tobacco: Never Used   Substance and Sexual Activity    Alcohol use: Yes     Comment: 10 drinks per week    Drug use: No    Sexual activity: Not on file   Lifestyle    Physical activity     Days per week: Not on file     Minutes per session: Not on file    Stress: Not on file   Relationships    Social connections     Talks on phone: Not on file     Gets together: Not on file     Attends Restorationism service: Not on file     Active member of club or organization: Not on file     Attends meetings of clubs or organizations: Not on file     Relationship status: Not on file    Intimate partner violence     Fear of current or ex partner: Not on file     Emotionally abused: Not on file     Physically abused: Not on file     Forced sexual activity: Not on file   Other Topics Concern    Not on file   Social History Narrative    Not on file     Family History   Adopted: Yes       Patient's medications, allergies, past medical, surgical, social and family histories were reviewed and updated as appropriate. Review of Systems  Pertinent items are noted in HPI      Vital Signs  There were no vitals filed for this visit. There is no height or weight on file to calculate BMI. Physical Exam    Hand Examination: Wounds are healing nicely she does have a fair amount of swelling but really not very severe pain    Additional Comments:     Additional Examinations:  X-Ray Findings: PA lateral and oblique x-rays of the left wrist.  Show that she now has 0 degrees dorsal tilt she is still about 2 mm to 3 mm ulnar negative in variance but substantial improvement of radial length and radial inclination. Additional Diagnostic Test Findings:    Office Procedures:       This dictation was performed with a verbal recognition program. It is possible that there are still dictated errors within this office note. All efforts were made to ensure that this office note is accurate. Orders Placed This Encounter   Procedures    XR WRIST LEFT (MIN 3 VIEWS)     Standing Status:   Future     Number of Occurrences:   1     Standing Expiration Date:   7/2/2021   Baljinder Green OT - [facility]     Referral Priority:   Routine     Referral Type:   Eval and Treat     Referral Reason:   Specialty Services Required     Requested Specialty:   Occupational Therapy     Number of Visits Requested:   1       Attestation: I have reviewed the chief complaint and history of present illness (including ROS and [address]) and vital documentation by my staff and I agree with their documentation and have added where applicable.

## 2020-07-02 NOTE — PLAN OF CARE
[facility]  [address], [address] [facility], [facility]  Phone: [phone] Fax: [phone]            Occupational Nicky Him  Dear Referring Practitioner: Anna Roa MD,     We had the pleasure of evaluating the following patient for occupational therapy services at [address] [place] [address]. A summary of our findings can be found in the initial assessment below. This includes our plan of care. If you have any questions or concerns regarding these findings, please do not hesitate to contact me at the office phone number checked above.   Thank you for the referral.     Physician Signature:_______________________________Date:__________________  By signing above (or electronic signature), therapists plan is approved by physician      Patient: Farhat Turcios   : 1955   MRN: [id number]  Referring Physician: Referring Practitioner: Anna Roa MD      Evaluation Date: 2020      Medical Diagnosis Information:  Diagnosis: S52.552D (ICD-10-CM) - Other closed extra-articular fracture of distal end of left radius with routine healing    Treatment Diagnosis: L wrist stiffness - M25.632              Insurance information:        Date of Injury: 2020  Date of Surgery: 20    Date of Patient follow up with Physician: 20    RESTRICTIONS/PRECAUTIONS: surgical protocol    Latex Allergy:  [x]No      []Yes  Pacemaker:  [x] No       [] Yes     Preferred Language for Healthcare:   [x]English       []other:     Functional Scale: 57% (Quick DASH)   Date assessed:  2020    SUBJECTIVE: Patient reported deficits/history of current problem: fell while at [place] home in April; wore a brace for several weeks until returned to [place] in late May;     Pain Scale: 1-2/10 at rest, 4/10 at times  [x]Constant      []Intermittent    []other:  Pain Location:  L wrist   Easing factors:  rest  Provocative Splinting    Frequency/Duration:  1-2 days per week for 6-8 weeks      GOALS:  Patient stated goal: getting hand movement back    [] Progressing: [] Met: [] Not Met: [] Adjusted    Therapist goals for Patient:   Short Term Goals: To be achieved in: 2 weeks  1. Independent in HEP and progression per patient tolerance, in order to prevent re-injury. [] Progressing: [] Met: [] Not Met: [] Adjusted   2. Patient will have a decrease in pain to facilitate improvement in movement, function, and ADLs as indicated by Functional Deficits. [] Progressing: [] Met: [] Not Met: [] Adjusted    Long Term Goals to be achieved in 6-8 weeks (through 8/27/20), including patient directed goals to address patient identified performance deficits:  1) Pt to be independent in graded HEP progression with a good level of effort and compliance. [] Progressing: [] Met: [] Not Met: [] Adjusted   2) Pt to report a score of </= 57% on the Quick DASH disability questionnaire for increased performance with carrying, moving, and handling objects. [] Progressing: [] Met: [] Not Met: [] Adjusted   3) Pt will demonstrate increased ROM to L wrist ext/flex >/= 40 deg each for improved independence with dressing and bathing tasks. [] Progressing: [] Met: [] Not Met: [] Adjusted   4) Pt will demonstrate increased strength to R /pinch >/= 50% of  for improved independence with opening jars/lids. [] Progressing: [] Met: [] Not Met: [] Adjusted   5) Pt will have a decrease in pain to 1-2/10 to facilitate return to cooking/cleaning, household duties.   [] Progressing: [] Met: [] Not Met: [] Adjusted        OCCUPATIONAL THERAPY EVALUATION COMPLEXITY JUSTIFICATION:    [x] An occupational profile and medical/therapy history, which includes:   [x] a brief history including medical and/or therapy records relating to the     presenting problem   [] an expanded review of medical and/or therapy records and additional review     of physical, cognitive or psychosocial history related to current functional    performance   [] an extensive additional review of review of medical and/or therapy records and physical, cognitive, or psychosocial history related to current    functional performance    [x] An assessment that identifies performance deficits (relating to physical, cognitive, or psychosocial skills) that result in activity limitations and/or participation restrictions:   [x] 1-3 performance deficits   [] 3-5 performance deficits   [] 5 or more performance deficits    [x] Clinical decision making of:   [x] low complexity, including analysis of occupational profile, data analysis from problem focused assessment, and consideration of a limited number of treatment options. No comorbidities affect occupational performance. No task modifications or assistance needed to complete evaluation. [] moderate complexity, including analysis of occupational profile, data analysis from detailed assessment and consideration of several treatment options. Comorbidities that affect occupational performance may be present. Minimal to moderate task modifications or assistance needed to complete assessment. [] high complexity, including analysis of occupational profile, analysis of data from comprehensive assessment and consideration of multiple treatment options. Multiple comorbidities present that affect occupational performance. Significant task modifications or assistance needed to complete assessment. Evaluation Code:  [x] Low Complexity EVAL 32803 (typically 30 minutes face to face)  [] Mod Complexity EVAL 02808 (typically 45 minutes face to face)  [] High Complexity EVAL 24947 (typically 60 minutes face to face)    Electronically signed by:   Karthikeyan Worthy/L, PT, MPT, [address] [place] [address], [phone], [id number]

## 2020-07-02 NOTE — PROGRESS NOTES
Veronica Wynn received a viral test for COVID-19. They were educated on isolation and quarantine as appropriate. For any symptoms, they were directed to seek care from their PCP, given contact information to establish with a doctor, directed to an urgent care or the emergency room.

## 2020-07-02 NOTE — FLOWSHEET NOTE
Estim (14952/04940)      Paraffin (47313)      US (11244)      Iontophoresis (46743)      Hot Pack      Cold Pack            INTERVENTIONS:      Therapeutic Exercise (25930)      AROM 10x2 each hook/fist, flat fist, thumb ext/flex, wrist and FA AROM - see below     PROM Gentle thumb, digital ext/flex x 5 each                   Therapeutic Activity (48635)                              Manual Therapy (01738)                  Neuromuscular Reeducation (51127) Cueing for exercise technique, avoidance of substitutions                 ADL Training (26380) Instructed on diagnosis specific anatomy, surgical precautions/protocol, joint protection, and ADL modifications                   HEP Training/Review See sheet(s)      Access Code: [email or web address]   URL: [email or web address]   Date: 07/02/2020   Prepared by:  Jaki Bell     Exercises   Finger Composite PROM - 10 reps - 3-4x daily - 7x weekly   Flat Fist - 10 reps - 3-4x daily - 7x weekly   Full Fist - 10 reps - 3-4x daily - 7x weekly   Composite Thumb PROM - 10 reps - 3-4x daily - 7x weekly   Thumb Extension/Flexion - 10 reps - 3-4x daily - 7x weekly   Wrist Extension/Flexion - Tenodesis - 10 reps - 3-4x daily - 7x weekly   Wrist Radial/Ulnar Deviation - 10 reps - 3-4x daily - 7x weekly   Forearm Rotation - 10 reps - 3-4x daily - 7x weekly              Splinting      Lcode: Fabricated L 3906 - L wrist splint     Orthotic Mgmt, Subsequent Enc (47131)      Orthotic Mgmt & Training (74708)            Other: Incision Care Dressed incisions with xeroform, gauze wrap, compression sleeve                               Therapeutic Exercise & NMR:  [x] (28962) Provided verbal/tactile cueing for activities related to strengthening, flexibility, endurance, ROM  for improvements in scapular, scapulothoracic and UE control with self care, reaching, carrying, lifting, house/yardwork, driving/computer work.    [] (21328) Provided verbal/tactile cueing for activities related to improving balance, coordination, kinesthetic sense, posture, motor skill, proprioception  to assist with  scapular, scapulothoracic and UE control with self care, reaching, carrying, lifting, house/yardwork, driving/computer work.     Therapeutic Activities & NMR:    [] (24148 or 16103) Provided verbal/tactile cueing for activities related to improving balance, coordination, kinesthetic sense, posture, motor skill, proprioception and motor activation to allow for proper function of scapular, scapulothoracic and UE control with self care, carrying, lifting, driving/computer work    Home Exercise Program:    [x] (18787) Reviewed/Progressed HEP activities related to strengthening, flexibility, endurance, ROM of scapular, scapulothoracic and UE control with self care, reaching, carrying, lifting, house/yardwork, driving/computer work  [] (38096) Reviewed/Progressed HEP activities related to improving balance, coordination, kinesthetic sense, posture, motor skill, proprioception of scapular, scapulothoracic and UE control with self care, reaching, carrying, lifting, house/yardwork, driving/computer work      Manual Treatments:  PROM / STM / Oscillations-Mobs:  G-I, II, III, IV (PA's, Inf., Post.)  [] (21072) Provided manual therapy to mobilize soft tissue/joints of cervical/CT, scapular GHJ and UE for the purpose of modulating pain, promoting relaxation,  increasing ROM, reducing/eliminating soft tissue swelling/inflammation/restriction, improving soft tissue extensibility and allowing for proper ROM for normal function with self care, reaching, carrying, lifting, house/yardwork, driving/computer work    ADL Training:  [x] (38552) Provided self-care/home management training related to activities of daily living and compensatory training, and/or use of adaptive equipment      Charges:  Timed Code Treatment Minutes: 25   Total Treatment Minutes: 55   Worker's Comp: Time In/Time Out     [x] EVAL (LOW) 74681 (typically 20 minutes face-to-face)    [] EVAL (MOD) 69417 (typically 30 minutes face-to-face)  [] EVAL (HIGH) 70781 (typically 45 minutes face-to-face)  [] OT Re-eval (33091)       [x] Gerson (13181) x  2    [] SZFUM(99457)  [] NMR (72087) x      [] Estim (attended) (95866)   [] Manual ([id number]) x      [] US (45033)  [] TA (87349) x      [] Paraffin (97645)  [] ADL  (88275) x     [x] Splint/L code:  L wrist splint   [] Estim (unattended) (06818)  [] Fluidotherapy (38789)  [] Other:      ASSESSMENT:  See eval    GOALS:  Patient stated goal: getting hand movement back    [] Progressing: [] Met: [] Not Met: [] Adjusted    Therapist goals for Patient:   Short Term Goals: To be achieved in: 2 weeks  1. Independent in HEP and progression per patient tolerance, in order to prevent re-injury. [] Progressing: [] Met: [] Not Met: [] Adjusted   2. Patient will have a decrease in pain to facilitate improvement in movement, function, and ADLs as indicated by Functional Deficits. [] Progressing: [] Met: [] Not Met: [] Adjusted    Long Term Goals to be achieved in 6-8 weeks (through 8/27/20), including patient directed goals to address patient identified performance deficits:  1) Pt to be independent in graded HEP progression with a good level of effort and compliance. [] Progressing: [] Met: [] Not Met: [] Adjusted   2) Pt to report a score of </= 57% on the Quick DASH disability questionnaire for increased performance with carrying, moving, and handling objects. [] Progressing: [] Met: [] Not Met: [] Adjusted   3) Pt will demonstrate increased ROM to L wrist ext/flex >/= 40 deg each for improved independence with dressing and bathing tasks. [] Progressing: [] Met: [] Not Met: [] Adjusted   4) Pt will demonstrate increased strength to R /pinch >/= 50% of  for improved independence with opening jars/lids.   [] Progressing: [] Met: [] Not Met: [] Adjusted   5) Pt will have a decrease in pain to 1-2/10 to facilitate return to

## 2020-07-06 ENCOUNTER — OFFICE VISIT (OUTPATIENT)
Dept: ORTHOPEDIC SURGERY | Age: 65
End: 2020-07-06

## 2020-07-06 LAB
SARS-COV-2: DETECTED
SOURCE: ABNORMAL

## 2020-07-06 PROCEDURE — 99024 POSTOP FOLLOW-UP VISIT: CPT | Performed by: ORTHOPAEDIC SURGERY

## 2020-07-06 NOTE — PROGRESS NOTES
Assessment: Carlos Enrique Felton had a COVID-19 positive test done on 2 July. So we took appropriate precautions today and removed her sutures    Treatment Plan: She is extremely stiff so I am going to have our hand therapist do some virtual visits with her but I also discussed active and passive range of motion at length    No follow-ups on file. Chief Complaint:  Follow-up (06/23/20 ORIF Left distal radius fracture and CTR)      History of Present Illness  Nyla Hansen is a 76 y.o. female. Location: left wrist  Severity: swelling and stiffness Duration: 04/30/20, surgery 06/23/20 Modifying factors: splint, ROM therapy  Associated symptoms: none    Medical History    Current Outpatient Medications on File Prior to Visit   Medication Sig Dispense Refill    lisinopril-hydroCHLOROthiazide (PRINZIDE;ZESTORETIC) 20-12.5 MG per tablet Take 1 tablet by mouth daily      Melatonin 10 MG TABS Take by mouth nightly      CALCIUM-MAGNESIUM PO Take by mouth daily      PSYLLIUM HUSK PO Take by mouth daily      Probiotic Product (PROBIOTIC PO) Take by mouth daily       Milk Thistle 1000 MG CAPS Take by mouth daily       Multiple Vitamins-Minerals (THERAPEUTIC MULTIVITAMIN-MINERALS) tablet Take 1 tablet by mouth daily.  Cholecalciferol (VITAMIN D PO) Take by mouth daily       Omega-3 Fatty Acids (FISH OIL PO) Take by mouth daily        No current facility-administered medications on file prior to visit.       Past Medical History:   Diagnosis Date    Hypertension      Allergies   Allergen Reactions    Penicillins     Sulfa Antibiotics      Social History     Socioeconomic History    Marital status: Single     Spouse name: Not on file    Number of children: Not on file    Years of education: Not on file    Highest education level: Not on file   Occupational History    Not on file   Social Needs    Financial resource strain: Not on file    Food insecurity     Worry: Not on file     Inability: Not on file   [employer] documentation by my staff and I agree with their documentation and have added where applicable.

## 2020-07-07 ENCOUNTER — HOSPITAL ENCOUNTER (OUTPATIENT)
Dept: OCCUPATIONAL THERAPY | Age: 65
Setting detail: THERAPIES SERIES
Discharge: HOME | End: 2020-07-07
Payer: MEDICARE

## 2020-07-07 PROCEDURE — 97110 THERAPEUTIC EXERCISES: CPT | Performed by: OCCUPATIONAL THERAPIST

## 2020-07-07 PROCEDURE — 97530 THERAPEUTIC ACTIVITIES: CPT | Performed by: OCCUPATIONAL THERAPIST

## 2020-07-07 PROCEDURE — 97112 NEUROMUSCULAR REEDUCATION: CPT | Performed by: OCCUPATIONAL THERAPIST

## 2020-07-07 NOTE — FLOWSHEET NOTE
[facility]  [address], [address], [address] [place] [address]  Phone: [phone] Fax: [phone]    Occupational Therapy Treatment Note/ Progress Report:     Date:  2020    Patient Name:  Usama Walter    :  1955  MRN: [id number]    Medical/Treatment Diagnosis Information:  · Diagnosis: S52.552D (ICD-10-CM) - Other closed extra-articular fracture of distal end of left radius with routine healing   · Treatment Diagnosis: L wrist stiffness - U97.364     Insurance/Certification information:     Physician Information:  Referring Practitioner: Belinda Mesa MD  Has the plan of care been signed (Y/N):        []  Yes  [x]  No       Visit # Insurance Allowable Auth Required   2  []  Yes []  No        Is this a Progress Report:     []  Yes  [x]  No      If Yes:  Date Range for reporting period:  Beginning  Ending    Progress report will be due (10 Rx or 30 days whichever is less): 02     Recertification will be due (POC Duration  / 90 days whichever is less): 20     Date of Injury: 2020  Date of Surgery: 20 s/p ORIF L distal radius, CTR     Date of Patient follow up with Physician: 20     RESTRICTIONS/PRECAUTIONS: surgical protocol     Latex Allergy:  [x]? No      []? Yes                    Pacemaker:  [x]? No       []? Yes      Preferred Language for Healthcare:   [x]? English       []? other:      Functional Scale: 57% (Quick DASH)                                 Date assessed:  2020     SUBJECTIVE: went by [place] testing clinic on 20 to determine COVID status due to recent return from [place], pt found to be positive for COVID 19. Pt reports no symptoms at this time and no symptoms other than a possible headache several weeks ago. Seen by MD in clinic for suture removal yesterday and advised to continue with therapy virtually. Reports persistent swelling and stiffness in digits.     Patient reported scapulothoracic and UE control with self care, reaching, carrying, lifting, house/yardwork, driving/computer work  [x] (47716) Reviewed/Progressed HEP activities related to improving balance, coordination, kinesthetic sense, posture, motor skill, proprioception of scapular, scapulothoracic and UE control with self care, reaching, carrying, lifting, house/yardwork, driving/computer work      Manual Treatments:  PROM / STM / Oscillations-Mobs:  G-I, II, III, IV (PA's, Inf., Post.)  [x] (99402) Provided manual therapy to mobilize soft tissue/joints of cervical/CT, scapular GHJ and UE for the purpose of modulating pain, promoting relaxation,  increasing ROM, reducing/eliminating soft tissue swelling/inflammation/restriction, improving soft tissue extensibility and allowing for proper ROM for normal function with self care, reaching, carrying, lifting, house/yardwork, driving/computer work    ADL Training:  [x] (11000) Provided self-care/home management training related to activities of daily living and compensatory training, and/or use of adaptive equipment      Charges:  Timed Code Treatment Minutes: 45   Total Treatment Minutes: 45   Worker's Comp: Time In/Time Out     [] EVAL (LOW) 59280 (typically 20 minutes face-to-face)    [] EVAL (MOD) 45378 (typically 30 minutes face-to-face)  [] EVAL (HIGH) [phone] (typically 45 minutes face-to-face)  [] OT Re-eval (91158)       [x] Gerson [phone] x 1    [] TDELQ(42529)  [x] NMR (96868) x  1    [] Estim (attended) (68412)   [] Manual ([id number]) x      [] US (15543)  [x] TA (28820) x 1    [] Paraffin (91433)  [] ADL  ([phone]) x     [] Splint/L code:  [] Estim (unattended) ([phone])  [] Fluidotherapy (74850)  [] Other:      ASSESSMENT: pt to be seen virtually due to recent COVID positive test result; ROM improved from initial evaluation date; reinforcement needed for exercise technique and frequency to continue to advance mobility, decrease edema    GOALS:  Patient stated goal: getting hand movement back    [x] Progressing: [] Met: [] Not Met: [] Adjusted    Therapist goals for Patient:   Short Term Goals: To be achieved in: 2 weeks  1. Independent in HEP and progression per patient tolerance, in order to prevent re-injury. [x] Progressing: [] Met: [] Not Met: [] Adjusted   2. Patient will have a decrease in pain to facilitate improvement in movement, function, and ADLs as indicated by Functional Deficits. [x] Progressing: [] Met: [] Not Met: [] Adjusted    Long Term Goals to be achieved in 6-8 weeks (through 8/27/20), including patient directed goals to address patient identified performance deficits:  1) Pt to be independent in graded HEP progression with a good level of effort and compliance. [x] Progressing: [] Met: [] Not Met: [] Adjusted   2) Pt to report a score of </= 57% on the Quick DASH disability questionnaire for increased performance with carrying, moving, and handling objects. [x] Progressing: [] Met: [] Not Met: [] Adjusted   3) Pt will demonstrate increased ROM to L wrist ext/flex >/= 40 deg each for improved independence with dressing and bathing tasks. [x] Progressing: [] Met: [] Not Met: [] Adjusted   4) Pt will demonstrate increased strength to R /pinch >/= 50% of  for improved independence with opening jars/lids. [x] Progressing: [] Met: [] Not Met: [] Adjusted   5) Pt will have a decrease in pain to 1-2/10 to facilitate return to cooking/cleaning, household duties. [x] Progressing: [] Met: [] Not Met: [] Adjusted      Overall Progression Towards Functional Goals/Treatment Progress Update:  [] Patient is progressing as expected towards functional goals listed. [x] Progression is slowed due to complexities/impairments listed - increased duration from injury (April) to surgery (June)  [] Progression has been slowed due to co-morbidities.   [] Plan just implemented, too soon to assess goals progression <30 days  [] Goals require adjustment due to lack of progress  [] Patient is not progressing as expected and requires additional follow up with physician  [] All goals are met  [] Other:     Prognosis for POC: [x] Good [] Fair  [] Poor    Patient requires continued skilled intervention: [x] Yes  [] No    Treatment/Activity Tolerance:  [x] Patient able to complete treatment  [] Patient limited by fatigue  [] Patient limited by pain    [] Patient limited by other medical complications  [] Other:     Ana Lozano is a 76 y.o. female being evaluated by a Virtual Visit (video visit) encounter to address concerns as mentioned above. A caregiver was present when appropriate. Due to this being a TeleHealth encounter (During BPEDS-49 public health emergency), evaluation of the following organ systems was limited: Vitals/Constitutional/EENT/Resp/CV/GI//MS/Neuro/Skin/Heme-Lymph-Imm. Pursuant to the emergency declaration under the [address] [place] [address] and the [employer] and [employer] Act, this Virtual Visit was conducted with patient's (and/or legal guardian's) consent, to reduce the patient's risk of exposure to COVID-19 and provide necessary medical care. The patient (and/or legal guardian) has also been advised to contact this office for worsening conditions or problems, and seek emergency medical treatment and/or call 911 if deemed necessary. Patient identification was verified at the start of the visit:Yes    Total time spent for this encounter:45 min    Services were provided through a video synchronous discussion virtually to substitute for in-person clinic visit. Patient and provider were located at their individual homes. --Kevin Adler OT on 7/7/2020 at 12:28 PM    An electronic signature was used to authenticate this note.                    PLAN:    [x] Continue per plan of care [] Alter current plan (see comments above)  [] Plan of care initiated [] Hold pending MD visit [] Discharge      Electronically signed by: Jaki ALEX/L, PT, MPT, CHT, [id number], [id number]      Note: If patient does not return for scheduled/ recommended follow up visits, this note will serve as a discharge from care along with most recent update on progress.

## 2020-07-09 ENCOUNTER — HOSPITAL ENCOUNTER (OUTPATIENT)
Dept: OCCUPATIONAL THERAPY | Age: 65
Setting detail: THERAPIES SERIES
Discharge: HOME | End: 2020-07-09
Payer: MEDICARE

## 2020-07-09 ENCOUNTER — APPOINTMENT (OUTPATIENT)
Dept: OCCUPATIONAL THERAPY | Age: 65
End: 2020-07-09
Payer: MEDICARE

## 2020-07-09 PROCEDURE — 97110 THERAPEUTIC EXERCISES: CPT | Performed by: OCCUPATIONAL THERAPIST

## 2020-07-09 PROCEDURE — 97112 NEUROMUSCULAR REEDUCATION: CPT | Performed by: OCCUPATIONAL THERAPIST

## 2020-07-09 NOTE — FLOWSHEET NOTE
[facility], [facility]  [address],  [address], [address] [facility]  Phone: [phone] Fax: [phone]    Occupational Therapy Treatment Note/ Progress Report:     Date:  2020    Patient Name:  Melody Graves    :  1955  MRN: [id number]    Medical/Treatment Diagnosis Information:  · Diagnosis: S52.552D (ICD-10-CM) - Other closed extra-articular fracture of distal end of left radius with routine healing   · Treatment Diagnosis: L wrist stiffness - L77.076     Insurance/Certification information:     Physician Information:  Referring Practitioner: Anna Rueda MD  Has the plan of care been signed (Y/N):        []  Yes  [x]  No       Visit # Insurance Allowable Auth Required   3  []  Yes []  No        Is this a Progress Report:     []  Yes  [x]  No      If Yes:  Date Range for reporting period:  Beginning  Ending    Progress report will be due (10 Rx or 30 days whichever is less): 6/3/52     Recertification will be due (POC Duration  / 90 days whichever is less): 20     Date of Injury: 2020  Date of Surgery: 20 s/p ORIF L distal radius, CTR     Date of Patient follow up with Physician: 20     RESTRICTIONS/PRECAUTIONS: surgical protocol     Latex Allergy:  [x]? No      []? Yes                    Pacemaker:  [x]? No       []? Yes      Preferred Language for Healthcare:   [x]? English       []? other:      Functional Scale: 57% (Quick DASH)                                 Date assessed:  2020     SUBJECTIVE: reports continued no symptoms since COVID-19 positive test on 20; reports compliance with HEP however requesting clarification on multiple exercises    Patient reported deficits/history of current problem: fell while at [place] home in April; wore a brace for several weeks until returned to [place] in late May     Pain Scale: 0/10        OBJECTIVE:       Date:  2020   Objective Measures/Tests: Virtual Visit  S/p 2 weeks   Virtual Visit  S/p 2.5 weeks   ROM:Digits: tips to DPFC   7cm IF  8cm LF  7.5cm RF  6cm SF 5cm IF  4cm LF  5.5cm RF  4cm SF     (converted from pt's inch measurements on personal ruler) 5cm IF  4cm LF  4.5cm RF  3.5cm SF    Passively tips to palm LF-SF   Thumb tip to DPFC    2.5cm  Thumb opposition to SF P1   Wrist ext/flex            rd/ud     ~10/~30   FA sup/pron     ~60/~80   Strength:            Observations:        Other:                  MODALITIES:      Fluidotherapy (00761)      Estim (84449/61437)      Paraffin (59505)      US (57521)      Iontophoresis (08940)      Hot Pack      Cold Pack            INTERVENTIONS:      Therapeutic Exercise (32168)      AROM 10x2 each hook/fist, flat fist, thumb ext/flex, wrist and FA AROM - see below 10x2 digital ext/flex, 10x flat fist    10x2 wrist tenodesis    10x2 FA rotation    10x wrist circumduction CW, 10x CCW 10x2 digital ext/flex, 10x flat fist    10x2 wrist tenodesis    10x2 FA rotation    10x wrist circumduction CW, 10x CCW   PROM Gentle thumb, digital ext/flex x 5 each   Gentle PROM instruction (gentle chin press for wrist ext, flex) x 10 each    Digital passive flexion x 5  Gentle PROM instruction (gentle chin press for wrist ext, flex) x 10 each    Prayer stretch for wrist ext x 10    PROM over edge of table - 10x wrist ext, flex    Digital passive flexion x 5; Place and Hold x 10               Therapeutic Activity (19909)      Page Turning  10x    Wrist Alphabet  Instructed in regular performance for AROM    Small objects  Instructed on use of paper, cotton balls for thumb flex/opp activity x 10    Pen rolling x 10x2 thumb at [facility]          Manual Therapy (33165)  Instructed on retrograde massage, STM                Neuromuscular Reeducation (54610) Cueing for exercise technique, avoidance of substitutions Cueing for exercise technique, avoidance of substitutions Frequent cueing for exercise technique, avoidance of Orthotic Mgmt, Subsequent Enc (71375)      Orthotic Mgmt & Training (52917)            Other: Incision Care Dressed incisions with xeroform, gauze wrap, compression sleeve                               Therapeutic Exercise & NMR:  [x] (50330) Provided verbal/tactile cueing for activities related to strengthening, flexibility, endurance, ROM  for improvements in scapular, scapulothoracic and UE control with self care, reaching, carrying, lifting, house/yardwork, driving/computer work. [x] (15108) Provided verbal/tactile cueing for activities related to improving balance, coordination, kinesthetic sense, posture, motor skill, proprioception  to assist with  scapular, scapulothoracic and UE control with self care, reaching, carrying, lifting, house/yardwork, driving/computer work.     Therapeutic Activities & NMR:    [x] (65273 or 30230) Provided verbal/tactile cueing for activities related to improving balance, coordination, kinesthetic sense, posture, motor skill, proprioception and motor activation to allow for proper function of scapular, scapulothoracic and UE control with self care, carrying, lifting, driving/computer work    Home Exercise Program:    [x] (50508) Reviewed/Progressed HEP activities related to strengthening, flexibility, endurance, ROM of scapular, scapulothoracic and UE control with self care, reaching, carrying, lifting, house/yardwork, driving/computer work  [x] (19577) Reviewed/Progressed HEP activities related to improving balance, coordination, kinesthetic sense, posture, motor skill, proprioception of scapular, scapulothoracic and UE control with self care, reaching, carrying, lifting, house/yardwork, driving/computer work      Manual Treatments:  PROM / STM / Oscillations-Mobs:  G-I, II, III, IV (PA's, Inf., Post.)  [x] (30922) Provided manual therapy to mobilize soft tissue/joints of cervical/CT, scapular GHJ and UE for the purpose of modulating pain, promoting relaxation,  increasing performance deficits:  1) Pt to be independent in graded HEP progression with a good level of effort and compliance. [x] Progressing: [] Met: [] Not Met: [] Adjusted   2) Pt to report a score of </= 57% on the Quick DASH disability questionnaire for increased performance with carrying, moving, and handling objects. [x] Progressing: [] Met: [] Not Met: [] Adjusted   3) Pt will demonstrate increased ROM to L wrist ext/flex >/= 40 deg each for improved independence with dressing and bathing tasks. [x] Progressing: [] Met: [] Not Met: [] Adjusted   4) Pt will demonstrate increased strength to R /pinch >/= 50% of  for improved independence with opening jars/lids. [x] Progressing: [] Met: [] Not Met: [] Adjusted   5) Pt will have a decrease in pain to 1-2/10 to facilitate return to cooking/cleaning, household duties. [x] Progressing: [] Met: [] Not Met: [] Adjusted      Overall Progression Towards Functional Goals/Treatment Progress Update:  [] Patient is progressing as expected towards functional goals listed. [x] Progression is slowed due to complexities/impairments listed - increased duration from injury (April) to surgery (June)  [] Progression has been slowed due to co-morbidities. [] Plan just implemented, too soon to assess goals progression <30 days  [] Goals require adjustment due to lack of progress  [] Patient is not progressing as expected and requires additional follow up with physician  [] All goals are met  [] Other:     Prognosis for POC: [x] Good [] Fair  [] Poor    Patient requires continued skilled intervention: [x] Yes  [] No    Treatment/Activity Tolerance:  [x] Patient able to complete treatment  [] Patient limited by fatigue  [] Patient limited by pain    [] Patient limited by other medical complications  [] Other:     Darya Gross is a 76 y.o. female being evaluated by a Virtual Visit (video visit) encounter to address concerns as mentioned above.   A caregiver was present when appropriate. Due to this being a TeleHealth encounter (During JPZXF-50 public health emergency), evaluation of the following organ systems was limited: Vitals/Constitutional/EENT/Resp/CV/GI//MS/Neuro/Skin/Heme-Lymph-Imm. Pursuant to the emergency declaration under the [address] [place] [address], [address] [place] [address] and the [employer] Act, this Virtual Visit was conducted with patient's (and/or legal guardian's) consent, to reduce the patient's risk of exposure to COVID-19 and provide necessary medical care. The patient (and/or legal guardian) has also been advised to contact this office for worsening conditions or problems, and seek emergency medical treatment and/or call 911 if deemed necessary. Patient identification was verified at the start of the visit:Yes    Total time spent for this encounter:45 min    Services were provided through a video synchronous discussion virtually to substitute for in-person clinic visit. Patient and provider were located at their individual homes. --Matthieu Simon OT on 7/9/2020 at 11:30 AM    An electronic signature was used to authenticate this note. PLAN:    [x] Continue per plan of care [] Alter current plan (see comments above)  [] Plan of care initiated [] Hold pending MD visit [] Discharge      Electronically signed by: Jaki ALEX/L, PT, MPT, CHT, [id number], [id number]      Note: If patient does not return for scheduled/ recommended follow up visits, this note will serve as a discharge from care along with most recent update on progress.

## 2020-07-13 ENCOUNTER — HOSPITAL ENCOUNTER (OUTPATIENT)
Dept: OCCUPATIONAL THERAPY | Age: 65
Setting detail: THERAPIES SERIES
Discharge: HOME | End: 2020-07-13
Payer: MEDICARE

## 2020-07-13 PROCEDURE — 97110 THERAPEUTIC EXERCISES: CPT | Performed by: OCCUPATIONAL THERAPIST

## 2020-07-13 PROCEDURE — 97112 NEUROMUSCULAR REEDUCATION: CPT | Performed by: OCCUPATIONAL THERAPIST

## 2020-07-13 NOTE — FLOWSHEET NOTE
[facility]  [address], [address] [facility], [address] [place] [address]  Phone: [phone] Fax: [phone]    Occupational Therapy Treatment Note/ Progress Report:     Date:  2020    Patient Name:  Sweta Pratt    :  1955  MRN: [id number]    Medical/Treatment Diagnosis Information:  · Diagnosis: S52.552D (ICD-10-CM) - Other closed extra-articular fracture of distal end of left radius with routine healing   · Treatment Diagnosis: L wrist stiffness - Y00.000     Insurance/Certification information:     Physician Information:  Referring Practitioner: Lam Conti MD  Has the plan of care been signed (Y/N):        []  Yes  [x]  No       Visit # Insurance Allowable Auth Required   4  []  Yes []  No        Is this a Progress Report:     []  Yes  [x]  No      If Yes:  Date Range for reporting period:  Beginning  Ending    Progress report will be due (10 Rx or 30 days whichever is less): 37     Recertification will be due (POC Duration  / 90 days whichever is less): 20     Date of Injury: 2020  Date of Surgery: 20 s/p ORIF L distal radius, CTR     Date of Patient follow up with Physician: 20     RESTRICTIONS/PRECAUTIONS: surgical protocol     Latex Allergy:  [x]? No      []? Yes                    Pacemaker:  [x]? No       []? Yes      Preferred Language for Healthcare:   [x]? English       []? other:      Functional Scale: 57% (Quick DASH)                                 Date assessed:  2020     SUBJECTIVE: reports continued no symptoms since COVID-19 positive test on 20; received notification from [facility] that required 10 day quarantine has passed; fingers moving better overall; admits limited HEP performance over weekend; Pt to have new COVID test performed tomorrow.     Patient reported deficits/history of current problem: fell while at [place] home in April; wore a brace for several weeks until returned to [place] in late Therapy ([id number]) Instructed on retrograde massage, STM                 Neuromuscular Reeducation (84640) Cueing for exercise technique, avoidance of substitutions Frequent cueing for exercise technique, avoidance of substitutions Frequent cueing for end range of motion techniques, exercise technique               ADL Training (15833) Reinforced nonresistive use of L UE for light ADLs (washing face, straightening glasses on face, feeding finger foods)                 HEP Training/Review  Reviewed and reinforced, updated for additional PROM techniques for wrist Reviewed and reinforced     Access Code: [email or web address]   URL: [email or web address]   Date: 07/09/2020   Prepared by: Jaki Bell     Exercises   Wrist Extension PROM - 3-5 reps - 5-10 sec hold - 3-4x daily - 7x weekly   Wrist Flexion PROM - 3-5 reps - 5-10 sec hold - 3-4x daily - 7x weekly   Wrist Stretches - 3-5 reps - 5-10 sec hold - 3-4x daily - 7x weekly   Prayer Stretch - 3-5 reps - 5-10 sec hold - 3-4x daily - 7x weekly   Finger Composite PROM - 10 reps - 3-4x daily - 7x weekly   Flat Fist - 10 reps - 3-4x daily - 7x weekly   Full Fist - 10 reps - 3-4x daily - 7x weekly   Composite Thumb PROM - 10 reps - 3-4x daily - 7x weekly   Thumb Extension/Flexion - 10 reps - 3-4x daily - 7x weekly   Wrist Extension/Flexion - Tenodesis - 10 reps - 3-4x daily - 7x weekly   Wrist Radial/Ulnar Deviation - 10 reps - 3-4x daily - 7x weekly   Forearm Rotation - 10 reps - 3-4x daily - 7x weekly             Splinting      Lcode:      Orthotic Mgmt, Subsequent Enc (13917)      Orthotic Mgmt & Training (92958)            Other: Incision Care                                Therapeutic Exercise & NMR:  [x] (52143) Provided verbal/tactile cueing for activities related to strengthening, flexibility, endurance, ROM  for improvements in scapular, scapulothoracic and UE control with self care, reaching, carrying, lifting, house/yardwork, driving/computer work.     [x] (64183) Provided verbal/tactile cueing for activities related to improving balance, coordination, kinesthetic sense, posture, motor skill, proprioception  to assist with  scapular, scapulothoracic and UE control with self care, reaching, carrying, lifting, house/yardwork, driving/computer work.     Therapeutic Activities & NMR:    [x] (29141 or 96819) Provided verbal/tactile cueing for activities related to improving balance, coordination, kinesthetic sense, posture, motor skill, proprioception and motor activation to allow for proper function of scapular, scapulothoracic and UE control with self care, carrying, lifting, driving/computer work    Home Exercise Program:    [x] (77104) Reviewed/Progressed HEP activities related to strengthening, flexibility, endurance, ROM of scapular, scapulothoracic and UE control with self care, reaching, carrying, lifting, house/yardwork, driving/computer work  [x] (65048) Reviewed/Progressed HEP activities related to improving balance, coordination, kinesthetic sense, posture, motor skill, proprioception of scapular, scapulothoracic and UE control with self care, reaching, carrying, lifting, house/yardwork, driving/computer work      Manual Treatments:  PROM / STM / Oscillations-Mobs:  G-I, II, III, IV (PA's, Inf., Post.)  [x] (84565) Provided manual therapy to mobilize soft tissue/joints of cervical/CT, scapular GHJ and UE for the purpose of modulating pain, promoting relaxation,  increasing ROM, reducing/eliminating soft tissue swelling/inflammation/restriction, improving soft tissue extensibility and allowing for proper ROM for normal function with self care, reaching, carrying, lifting, house/yardwork, driving/computer work    ADL Training:  [x] (69785) Provided self-care/home management training related to activities of daily living and compensatory training, and/or use of adaptive equipment      Charges:  Timed Code Treatment Minutes: 30   Total Treatment Minutes: 30   Worker's Comp:

## 2020-07-14 ENCOUNTER — OFFICE VISIT (OUTPATIENT)
Dept: PRIMARY CARE CLINIC | Age: 65
End: 2020-07-14
Payer: MEDICARE

## 2020-07-14 PROCEDURE — G8428 CUR MEDS NOT DOCUMENT: HCPCS | Performed by: NURSE PRACTITIONER

## 2020-07-14 PROCEDURE — 99211 OFF/OP EST MAY X REQ PHY/QHP: CPT | Performed by: NURSE PRACTITIONER

## 2020-07-14 PROCEDURE — G8420 CALC BMI NORM PARAMETERS: HCPCS | Performed by: NURSE PRACTITIONER

## 2020-07-14 NOTE — PROGRESS NOTES
Jef Gil received a viral test for COVID-19. They were educated on isolation and quarantine as appropriate. For any symptoms, they were directed to seek care from their PCP, given contact information to establish with a doctor, directed to an urgent care or the emergency room.

## 2020-07-16 ENCOUNTER — HOSPITAL ENCOUNTER (OUTPATIENT)
Dept: OCCUPATIONAL THERAPY | Age: 65
Setting detail: THERAPIES SERIES
Discharge: HOME | End: 2020-07-16
Payer: MEDICARE

## 2020-07-16 PROCEDURE — 97110 THERAPEUTIC EXERCISES: CPT | Performed by: OCCUPATIONAL THERAPIST

## 2020-07-16 PROCEDURE — 97112 NEUROMUSCULAR REEDUCATION: CPT | Performed by: OCCUPATIONAL THERAPIST

## 2020-07-16 NOTE — FLOWSHEET NOTE
[facility]   [address],  [address], [facility]  Phone: [phone] Fax: [phone]    Occupational Therapy Treatment Note/ Progress Report:     Date:  2020    Patient Name:  Veronica Wynn    :  1955  MRN: [id number]    Medical/Treatment Diagnosis Information:  · Diagnosis: S52.552D (ICD-10-CM) - Other closed extra-articular fracture of distal end of left radius with routine healing   · Treatment Diagnosis: L wrist stiffness - T04.173     Insurance/Certification information:     Physician Information:  Referring Practitioner: Isauro Null MD  Has the plan of care been signed (Y/N):        []  Yes  [x]  No       Visit # Insurance Allowable Auth Required   5  []  Yes []  No        Is this a Progress Report:     []  Yes  [x]  No      If Yes:  Date Range for reporting period:  Beginning  Ending    Progress report will be due (10 Rx or 30 days whichever is less): 14     Recertification will be due (POC Duration  / 90 days whichever is less): 20     Date of Injury: 2020  Date of Surgery: 20 s/p ORIF L distal radius, CTR     Date of Patient follow up with Physician: 20     RESTRICTIONS/PRECAUTIONS: surgical protocol - discussed potential use of static progressing bracing with MD on 20, consideration to be given once pt is s/p 6 weeks     Latex Allergy:  [x]? No      []? Yes                    Pacemaker:  [x]? No       []? Yes      Preferred Language for Healthcare:   [x]? English       []? other:      Functional Scale: 57% (Quick DASH)                                 Date assessed:  2020     SUBJECTIVE: reports continued no symptoms since COVID-19 positive test on 20; had new COVID test performed on 20, however no results as of yet;  tested one week ago, but no results as of yet; reports persistent stiffness in wrist    Patient reported deficits/history of current problem: fell while at [place] home in April; wore a brace for several weeks until returned to [facility] in late May     Pain Scale: 0/10        OBJECTIVE:       Date: 7/7/20 7/9/20 7/13/20 7/16/20   Objective Measures/Tests: Virtual Visit  S/p 2 weeks   Virtual Visit  S/p 2.5 weeks Virtual Visit  S/p 3 week Virtual Visit  S/p 3 weeks, 2 days   ROM:Digits: tips to DPFC   5cm IF  4cm LF  5.5cm RF  4cm SF     (converted from pt's inch measurements on personal ruler) 5cm IF  4cm LF  4.5cm RF  3.5cm SF    Passively tips to palm LF-SF Active flexion of tips to palm at end of session Active flexion of digits to palm   Thumb tip to DPFC   2.5cm  Thumb opposition to SF P1     Wrist ext/flex            rd/ud    ~10/~30 ~20/~40 ~10/~40   FA sup/pron    ~60/~80 ~75/~80 ~80/~80   Strength:              Observations:         Other:                     MODALITIES:       Fluidotherapy (34336)       Estim (71630/16238)       Paraffin (07455)       US (57856)       Iontophoresis (17025)       Hot Pack       Cold Pack              INTERVENTIONS:       Therapeutic Exercise (60801)       AROM 10x2 digital ext/flex, 10x flat fist    10x2 wrist tenodesis    10x2 FA rotation    10x wrist circumduction CW, 10x CCW 10x2 digital ext/flex, 10x flat fist    10x2 wrist tenodesis    10x2 FA rotation    10x wrist circumduction CW, 10x CCW 10x2 digital ext/flex, 10x flat fist    10x2 wrist tenodesis    10x2 FA rotation    10x wrist circumduction CW, 10x CCW 10x2 digital flex/ext    10x intrinsic +; use of lipstick in hand for intrinsic rolling x 10    10x2 FA rotation    10x wrist circles   PROM Gentle PROM instruction (gentle chin press for wrist ext, flex) x 10 each    Digital passive flexion x 5  Gentle PROM instruction (gentle chin press for wrist ext, flex) x 10 each    Prayer stretch for wrist ext x 10    PROM over edge of table - 10x wrist ext, flex    Digital passive flexion x 5; Place and Hold x 10 Prayer stretch x 10 Prayer stretch (fingers extended) x 10, (fingers interlocked) x 10    Chin press wrist ext stretch x 5    Instructed on alternative wrist ext stretching - wall slides, ball roll on wall -                  Therapeutic Activity (20374)       Page Turning 10x      Wrist Alphabet Instructed in regular performance for AROM  Wrist alphabet x 1 series    Small objects Instructed on use of paper, cotton balls for thumb flex/opp activity x 10    Pen rolling x 10x2 thumb at [facility]  Reviewed activities to promote digital flexion, FA sup using small objects            Manual Therapy (00952) Instructed on retrograde massage, STM                    Neuromuscular Reeducation (68367) Cueing for exercise technique, avoidance of substitutions Frequent cueing for exercise technique, avoidance of substitutions Frequent cueing for end range of motion techniques, exercise technique Frequent cueing for end range of motion techniques, avoidance of substitutions, exercise technique; reinforcement of frequency of HEP for optimal ROM recovery                 ADL Training (31871) Reinforced nonresistive use of L UE for light ADLs (washing face, straightening glasses on face, feeding finger foods)                    HEP Training/Review  Reviewed and reinforced, updated for additional PROM techniques for wrist Reviewed and reinforced Reviewed and reinforced     Access Code: [email or web address]   URL: [employer] [employer]/   Date: 07/09/2020   Prepared by:  Jaki Bell     Exercises   Wrist Extension PROM - 3-5 reps - 5-10 sec hold - 3-4x daily - 7x weekly   Wrist Flexion PROM - 3-5 reps - 5-10 sec hold - 3-4x daily - 7x weekly   Wrist Stretches - 3-5 reps - 5-10 sec hold - 3-4x daily - 7x weekly   Prayer Stretch - 3-5 reps - 5-10 sec hold - 3-4x daily - 7x weekly   Finger Composite PROM - 10 reps - 3-4x daily - 7x weekly   Flat Fist - 10 reps - 3-4x daily - 7x weekly   Full Fist - 10 reps - 3-4x daily - 7x weekly   Composite Thumb PROM - 10 reps - 3-4x daily - 7x weekly   Thumb Extension/Flexion - 10 reps - 3-4x daily - 7x weekly   Wrist Extension/Flexion - Tenodesis - 10 reps - 3-4x daily - 7x weekly   Wrist Radial/Ulnar Deviation - 10 reps - 3-4x daily - 7x weekly   Forearm Rotation - 10 reps - 3-4x daily - 7x weekly               Splinting       Lcode:       Orthotic Mgmt, Subsequent Enc (66363)       Orthotic Mgmt & Training (72571)              Other: Incision Care                                     Therapeutic Exercise & NMR:  [x] (12958) Provided verbal/tactile cueing for activities related to strengthening, flexibility, endurance, ROM  for improvements in scapular, scapulothoracic and UE control with self care, reaching, carrying, lifting, house/yardwork, driving/computer work. [x] (64103) Provided verbal/tactile cueing for activities related to improving balance, coordination, kinesthetic sense, posture, motor skill, proprioception  to assist with  scapular, scapulothoracic and UE control with self care, reaching, carrying, lifting, house/yardwork, driving/computer work.     Therapeutic Activities & NMR:    [x] (31103 or 86172) Provided verbal/tactile cueing for activities related to improving balance, coordination, kinesthetic sense, posture, motor skill, proprioception and motor activation to allow for proper function of scapular, scapulothoracic and UE control with self care, carrying, lifting, driving/computer work    Home Exercise Program:    [x] (45440) Reviewed/Progressed HEP activities related to strengthening, flexibility, endurance, ROM of scapular, scapulothoracic and UE control with self care, reaching, carrying, lifting, house/yardwork, driving/computer work  [x] (56241) Reviewed/Progressed HEP activities related to improving balance, coordination, kinesthetic sense, posture, motor skill, proprioception of scapular, scapulothoracic and UE control with self care, reaching, carrying, lifting, house/yardwork, driving/computer work      Manual Treatments:  PROM / STM / Oscillations-Mobs:  G-I, II, III, IV (PA's, Inf., Post.)  [x] (99150) Provided manual therapy to mobilize soft tissue/joints of cervical/CT, scapular GHJ and UE for the purpose of modulating pain, promoting relaxation,  increasing ROM, reducing/eliminating soft tissue swelling/inflammation/restriction, improving soft tissue extensibility and allowing for proper ROM for normal function with self care, reaching, carrying, lifting, house/yardwork, driving/computer work    ADL Training:  [x] (20520) Provided self-care/home management training related to activities of daily living and compensatory training, and/or use of adaptive equipment      Charges:  Timed Code Treatment Minutes: 28   Total Treatment Minutes: 28   Worker's Comp: Time In/Time Out     [] EVAL (LOW) 40012 (typically 20 minutes face-to-face)    [] EVAL (MOD) 91544 (typically 30 minutes face-to-face)  [] EVAL (HIGH) 15723 (typically 45 minutes face-to-face)  [] OT Re-eval (34989)       [x] Gerson [phone] x 1    [] WCXCB(69017)  [x] NMR (71195) x  1    [] Estim (attended) (62469)   [] Manual ([id number]) x      [] US (23620)  [] TA (76278) x    [] Paraffin (08060)  [] ADL  (46313) x     [] Splint/L code:  [] Estim (unattended) (22 180365)  [] Fluidotherapy (02791)  [] Other:      ASSESSMENT: persistent stiffness in wrist extension;  ROM of digits, FA appear to be progressing nicely; frequent reinforcement of technique, frequency of HEP needed consistently during sessions    GOALS:  Patient stated goal: getting hand movement back    [x] Progressing: [] Met: [] Not Met: [] Adjusted    Therapist goals for Patient:   Short Term Goals: To be achieved in: 2 weeks  1. Independent in HEP and progression per patient tolerance, in order to prevent re-injury. [x] Progressing: [] Met: [] Not Met: [] Adjusted   2. Patient will have a decrease in pain to facilitate improvement in movement, function, and ADLs as indicated by Functional Deficits.     [x] Progressing: [] Met: [] Not Met: [] Adjusted    Long Term Goals to be achieved in 6-8 weeks (through 8/27/20), including patient directed goals to address patient identified performance deficits:  1) Pt to be independent in graded HEP progression with a good level of effort and compliance. [x] Progressing: [] Met: [] Not Met: [] Adjusted   2) Pt to report a score of </= 57% on the Quick DASH disability questionnaire for increased performance with carrying, moving, and handling objects. [x] Progressing: [] Met: [] Not Met: [] Adjusted   3) Pt will demonstrate increased ROM to L wrist ext/flex >/= 40 deg each for improved independence with dressing and bathing tasks. [x] Progressing: [] Met: [] Not Met: [] Adjusted   4) Pt will demonstrate increased strength to R /pinch >/= 50% of  for improved independence with opening jars/lids. [x] Progressing: [] Met: [] Not Met: [] Adjusted   5) Pt will have a decrease in pain to 1-2/10 to facilitate return to cooking/cleaning, household duties. [x] Progressing: [] Met: [] Not Met: [] Adjusted      Overall Progression Towards Functional Goals/Treatment Progress Update:  [] Patient is progressing as expected towards functional goals listed. [x] Progression is slowed due to complexities/impairments listed - increased duration from injury (April) to surgery (June)  [] Progression has been slowed due to co-morbidities.   [] Plan just implemented, too soon to assess goals progression <30 days  [] Goals require adjustment due to lack of progress  [] Patient is not progressing as expected and requires additional follow up with physician  [] All goals are met  [] Other:     Prognosis for POC: [x] Good [] Fair  [] Poor    Patient requires continued skilled intervention: [x] Yes  [] No    Treatment/Activity Tolerance:  [x] Patient able to complete treatment  [] Patient limited by fatigue  [] Patient limited by pain    [] Patient limited by other medical complications  [] Other:     Yumiko Hawley is a 76 y.o. female being evaluated by a Virtual Visit (video visit) encounter to address concerns as mentioned above. A caregiver was present when appropriate. Due to this being a TeleHealth encounter (During WBQSM-04 public health emergency), evaluation of the following organ systems was limited: Vitals/Constitutional/EENT/Resp/CV/GI//MS/Neuro/Skin/Heme-Lymph-Imm. Pursuant to the emergency declaration under the [address] [place] [address], [address] [place] [address] authority and the [employer] Act, this Virtual Visit was conducted with patient's (and/or legal guardian's) consent, to reduce the patient's risk of exposure to COVID-19 and provide necessary medical care. The patient (and/or legal guardian) has also been advised to contact this office for worsening conditions or problems, and seek emergency medical treatment and/or call 911 if deemed necessary. Patient identification was verified at the start of the visit:Yes    Total time spent for this encounter:28 min    Services were provided through a video synchronous discussion virtually to substitute for in-person clinic visit. Patient and provider were located at their individual homes. --Norma Lechuga OT on 7/16/2020 at 10:53 AM    An electronic signature was used to authenticate this note. PLAN:    [x] Continue per plan of care. [] Alter current plan (see comments above)  [] Plan of care initiated [] Hold pending MD visit [] Discharge      Electronically signed by: Jaki ALEX/L, PT, MPT, CHT, [id number], [id number]      Note: If patient does not return for scheduled/ recommended follow up visits, this note will serve as a discharge from care along with most recent update on progress.

## 2020-07-19 LAB
SARS-COV-2: NOT DETECTED
SOURCE: NORMAL

## 2020-07-20 ENCOUNTER — HOSPITAL ENCOUNTER (OUTPATIENT)
Dept: OCCUPATIONAL THERAPY | Age: 65
Setting detail: THERAPIES SERIES
Discharge: HOME | End: 2020-07-20
Payer: MEDICARE

## 2020-07-20 PROCEDURE — 97530 THERAPEUTIC ACTIVITIES: CPT | Performed by: OCCUPATIONAL THERAPIST

## 2020-07-20 PROCEDURE — 97140 MANUAL THERAPY 1/> REGIONS: CPT | Performed by: OCCUPATIONAL THERAPIST

## 2020-07-20 PROCEDURE — 97022 WHIRLPOOL THERAPY: CPT | Performed by: OCCUPATIONAL THERAPIST

## 2020-07-20 PROCEDURE — 97110 THERAPEUTIC EXERCISES: CPT | Performed by: OCCUPATIONAL THERAPIST

## 2020-07-20 NOTE — FLOWSHEET NOTE
[facility], [place]  [address], [address], [address] [facility]  Phone: [phone] Fax: [phone]    Occupational Therapy Treatment Note/ Progress Report:     Date:  2020    Patient Name:  Darnell Skelton    :  1955  MRN: [id number]    Medical/Treatment Diagnosis Information:  · Diagnosis: S52.552D (ICD-10-CM) - Other closed extra-articular fracture of distal end of left radius with routine healing   · Treatment Diagnosis: L wrist stiffness - B55.438     Insurance/Certification information:     Physician Information:  Referring Practitioner: Rimma Mazariegos MD  Has the plan of care been signed (Y/N):        []  Yes  [x]  No       Visit # Insurance Allowable Auth Required   6  []  Yes []  No        Is this a Progress Report:     []  Yes  [x]  No      If Yes:  Date Range for reporting period:  Beginning  Ending    Progress report will be due (10 Rx or 30 days whichever is less): 35     Recertification will be due (POC Duration  / 90 days whichever is less): 20     Date of Injury: 2020  Date of Surgery: 20 s/p ORIF L distal radius, CTR     Date of Patient follow up with Physician: 20     RESTRICTIONS/PRECAUTIONS: surgical protocol - discussed potential use of static progressing bracing with MD on 20, consideration to be given once pt is s/p 6 weeks     Latex Allergy:  [x]? No      []? Yes                    Pacemaker:  [x]? No       []? Yes      Preferred Language for Healthcare:   [x]? English       []? other:      Functional Scale: 57% (Quick DASH)                                 Date assessed:  2020     SUBJECTIVE: COVID negative test received; frustrated by slow progress of L wrist/hand; admits limited HEP frequency    Patient reported deficits/history of current problem: fell while at [place] home in April; wore a brace for several weeks until returned to [place] in late May     Pain Scale: 0/10 OBJECTIVE:       Date: 7/13/20 7/16/20 7/20/20   Objective Measures/Tests: Virtual Visit  S/p 3 week Virtual Visit  S/p 3 weeks, 2 days S/p 4 weeks erika   ROM:Digits: tips to [facility]   Active flexion of tips to palm at end of session Active flexion of digits to palm 2cm IF/LF/SF  3cm RF    (all tips to palm)   Thumb tip to DPFC     1.25cm   Wrist ext/flex            rd/ud   ~20/~40 ~10/~40 45/42  20/25   FA sup/pron   ~75/~80 ~80/~80 65/78   Strength:    II R     L 5, 10 (after cueing for  mechancis)         Observations:        Other:                  MODALITIES:      Fluidotherapy (40996)   15'   Estim (26895/32954)      Paraffin (83514)      US (50628)      Iontophoresis (57040)      Hot Pack      Cold Pack            INTERVENTIONS:      Therapeutic Exercise (47878)      AROM 10x2 digital ext/flex, 10x flat fist    10x2 wrist tenodesis    10x2 FA rotation    10x wrist circumduction CW, 10x CCW 10x2 digital flex/ext    10x intrinsic +; use of lipstick in hand for intrinsic rolling x 10    10x2 FA rotation    10x wrist circles 10x2 each wrist/hand tenodesis, RD/UD, FA rotation    10x full fist, flat fist    10x wrist circles    5x shoulder circles   PROM Prayer stretch x 10 Prayer stretch (fingers extended) x 10, (fingers interlocked) x 10    Chin press wrist ext stretch x 5    Instructed on alternative wrist ext stretching - wall slides, ball roll on wall -      10x2 wrist, FA PROM    10x intrinsic stretching, composite flexion of digits               Therapeutic Activity (16208)      Page Turning      Wrist Alphabet Wrist alphabet x 1 series     FA Bar   10x2, no weight   Sponge exercises   10x each gripping, LP, tip pinching   Small objects Reviewed activities to promote digital flexion, FA sup using small objects      Wrist maze   10x   Manual Therapy (83600)   8' STM, retrograde massage, gentle joint mobilizations, AA/PROM               Neuromuscular Reeducation (77343) Frequent cueing for end range of motion techniques, exercise technique Frequent cueing for end range of motion techniques, avoidance of substitutions, exercise technique; reinforcement of frequency of HEP for optimal ROM recovery Cueing for exercise technique, avoidance of substitutions, wrist/hand mechanics               ADL Training (09978)                  HEP Training/Review Reviewed and reinforced Reviewed and reinforced Reviewed and reinforced    Added sponge squeezes               Splinting      Lcode:      Orthotic Mgmt, Subsequent Enc (94916)      Orthotic Mgmt & Training (47299)            Other: Incision Care                                Therapeutic Exercise & NMR:  [x] (78031) Provided verbal/tactile cueing for activities related to strengthening, flexibility, endurance, ROM  for improvements in scapular, scapulothoracic and UE control with self care, reaching, carrying, lifting, house/yardwork, driving/computer work. [x] (34565) Provided verbal/tactile cueing for activities related to improving balance, coordination, kinesthetic sense, posture, motor skill, proprioception  to assist with  scapular, scapulothoracic and UE control with self care, reaching, carrying, lifting, house/yardwork, driving/computer work.     Therapeutic Activities & NMR:    [x] (17959 or 93836) Provided verbal/tactile cueing for activities related to improving balance, coordination, kinesthetic sense, posture, motor skill, proprioception and motor activation to allow for proper function of scapular, scapulothoracic and UE control with self care, carrying, lifting, driving/computer work    Home Exercise Program:    [x] (42355) Reviewed/Progressed HEP activities related to strengthening, flexibility, endurance, ROM of scapular, scapulothoracic and UE control with self care, reaching, carrying, lifting, house/yardwork, driving/computer work  [x] (73721) Reviewed/Progressed HEP activities related to improving balance, coordination, kinesthetic sense, posture, motor skill, proprioception of scapular, scapulothoracic and UE control with self care, reaching, carrying, lifting, house/yardwork, driving/computer work      Manual Treatments:  PROM / STM / Oscillations-Mobs:  G-I, II, III, IV (PA's, Inf., Post.)  [x] (44347) Provided manual therapy to mobilize soft tissue/joints of cervical/CT, scapular GHJ and UE for the purpose of modulating pain, promoting relaxation,  increasing ROM, reducing/eliminating soft tissue swelling/inflammation/restriction, improving soft tissue extensibility and allowing for proper ROM for normal function with self care, reaching, carrying, lifting, house/yardwork, driving/computer work    ADL Training:  [x] (13396) Provided self-care/home management training related to activities of daily living and compensatory training, and/or use of adaptive equipment      Charges:  Timed Code Treatment Minutes: 50   Total Treatment Minutes: 65   Worker's Comp: Time In/Time Out     [] EVAL (LOW) 46595 (typically 20 minutes face-to-face)    [] EVAL (MOD) 98103 (typically 30 minutes face-to-face)  [] EVAL (HIGH) 42619 (typically 45 minutes face-to-face)  [] OT Re-eval (11994)       [x] Gerson [phone] x 1    [] APTEY(40939)  [x] NMR (34988) x  1    [] Estim (attended) (88710)   [] Manual ([address] [facility]) x      [] US (50005)  [x] TA () x 1   [] Paraffin (09425)  [] ADL  ([phone]) x     [] Splint/L code:  [] Estim (unattended) (22 089131)  [x] Fluidotherapy (26605)  [] Other:      ASSESSMENT: notably improved ROM post-tx on this date; reinforcement of hand mechanics and exercise technique needed    GOALS:  Patient stated goal: getting hand movement back    [x] Progressing: [] Met: [] Not Met: [] Adjusted    Therapist goals for Patient:   Short Term Goals: To be achieved in: 2 weeks  1. Independent in HEP and progression per patient tolerance, in order to prevent re-injury. [x] Progressing: [] Met: [] Not Met: [] Adjusted   2.  Patient will have a decrease in pain to facilitate improvement in movement, function, and ADLs as indicated by Functional Deficits. [x] Progressing: [] Met: [] Not Met: [] Adjusted    Long Term Goals to be achieved in 6-8 weeks (through 8/27/20), including patient directed goals to address patient identified performance deficits:  1) Pt to be independent in graded HEP progression with a good level of effort and compliance. [x] Progressing: [] Met: [] Not Met: [] Adjusted   2) Pt to report a score of </= 57% on the Quick DASH disability questionnaire for increased performance with carrying, moving, and handling objects. [x] Progressing: [] Met: [] Not Met: [] Adjusted   3) Pt will demonstrate increased ROM to L wrist ext/flex >/= 40 deg each for improved independence with dressing and bathing tasks. [x] Progressing: [] Met: [] Not Met: [] Adjusted   4) Pt will demonstrate increased strength to R /pinch >/= 50% of  for improved independence with opening jars/lids. [x] Progressing: [] Met: [] Not Met: [] Adjusted   5) Pt will have a decrease in pain to 1-2/10 to facilitate return to cooking/cleaning, household duties. [] Progressing: [x] [facility]:4/76/7871  [] Not Met: [] Adjusted      Overall Progression Towards Functional Goals/Treatment Progress Update:  [] Patient is progressing as expected towards functional goals listed. [x] Progression is slowed due to complexities/impairments listed - increased duration from injury (April) to surgery (June)  [] Progression has been slowed due to co-morbidities.   [] Plan just implemented, too soon to assess goals progression <30 days  [] Goals require adjustment due to lack of progress  [] Patient is not progressing as expected and requires additional follow up with physician  [] All goals are met  [] Other:     Prognosis for POC: [x] Good [] Fair  [] Poor    Patient requires continued skilled intervention: [x] Yes  [] No    Treatment/Activity Tolerance:  [x] Patient able to complete treatment  [] Patient limited by fatigue  [] Patient limited by pain    [] Patient limited by other medical complications  [] Other:                  PLAN:    [x] Continue per plan of care. [] Alter current plan (see comments above)  [] Plan of care initiated [] Hold pending MD visit [] Discharge      Electronically signed by: Jaki ALEX/L, PT, MPT, CHT, [id number], [id number]      Note: If patient does not return for scheduled/ recommended follow up visits, this note will serve as a discharge from care along with most recent update on progress.

## 2020-07-23 ENCOUNTER — HOSPITAL ENCOUNTER (OUTPATIENT)
Dept: OCCUPATIONAL THERAPY | Age: 65
Setting detail: THERAPIES SERIES
Discharge: HOME | End: 2020-07-23
Payer: MEDICARE

## 2020-07-23 PROCEDURE — 97110 THERAPEUTIC EXERCISES: CPT | Performed by: OCCUPATIONAL THERAPIST

## 2020-07-23 PROCEDURE — 97022 WHIRLPOOL THERAPY: CPT | Performed by: OCCUPATIONAL THERAPIST

## 2020-07-23 PROCEDURE — 97140 MANUAL THERAPY 1/> REGIONS: CPT | Performed by: OCCUPATIONAL THERAPIST

## 2020-07-23 PROCEDURE — 97530 THERAPEUTIC ACTIVITIES: CPT | Performed by: OCCUPATIONAL THERAPIST

## 2020-07-23 NOTE — FLOWSHEET NOTE
Date: 7/16/20 7/20/20 7/23/20   Objective Measures/Tests: Virtual Visit  S/p 3 weeks, 2 days S/p 4 weeks erika    ROM:Digits: tips to [facility]   Active flexion of digits to palm 2cm IF/LF/SF  3cm RF    (all tips to palm) 1.5cm IF/LF/SF  2cm RF   Thumb tip to DPFC    1.25cm 1cm   Wrist ext/flex            rd/ud   ~10/~40 45/42  20/25 45/42   FA sup/pron   ~80/~80 65/78 75/80   Strength:   II R     L 5, 10 (after cueing for  mechancis)          Observations:        Other:                  MODALITIES:      Fluidotherapy (37487)  15' 20'   Estim (03240/86287)      Paraffin (10350)      US (27906)      Iontophoresis (78841)      Hot Pack      Cold Pack            INTERVENTIONS:      Therapeutic Exercise (25629)      AROM 10x2 digital flex/ext    10x intrinsic +; use of lipstick in hand for intrinsic rolling x 10    10x2 FA rotation    10x wrist circles 10x2 each wrist/hand tenodesis, RD/UD, FA rotation    10x full fist, flat fist    10x wrist circles    5x shoulder circles 10x2 each wrist/hand tenodesis, RD/UD, FA rotation    10x full fist, flat fist    10x wrist circles   PROM Prayer stretch (fingers extended) x 10, (fingers interlocked) x 10    Chin press wrist ext stretch x 5    Instructed on alternative wrist ext stretching - wall slides, ball roll on wall -      10x2 wrist, FA PROM    10x intrinsic stretching, composite flexion of digits 10x2 wrist, FA PROM    10x2 intrinsic stretching, composite flexion of digits               Therapeutic Activity (70149)      Page Turning      Hand Gypsy   Mild resistance, 10x3   FA Bar  10x2, no weight    Sponge exercises  10x each gripping, LP, tip pinching Use of light tea strainer to  foam blocks - modified with 1\" thick foam in palm x 10, 1/2\" x 20, no modification x 20   Wrist Wrap   No resistance, 10x each wrist ext, flex in FA sup and pron   Small objects      Wrist maze  10x    Manual Therapy (89993)  8' STM, retrograde massage, gentle joint mobilizations, AA/PROM                Neuromuscular Reeducation (47652) Frequent cueing for end range of motion techniques, avoidance of substitutions, exercise technique; reinforcement of frequency of HEP for optimal ROM recovery Cueing for exercise technique, avoidance of substitutions, wrist/hand mechanics Cueing as prior               ADL Training (51190)                  HEP Training/Review Reviewed and reinforced Reviewed and reinforced    Added sponge squeezes Reviewed and reinforced               Splinting      Lcode:      Orthotic Mgmt, Subsequent Enc (63951)   Modified splint for increased fit and comfort   Orthotic Mgmt & Training (04330)            Other: Incision Care                                Therapeutic Exercise & NMR:  [x] (99662) Provided verbal/tactile cueing for activities related to strengthening, flexibility, endurance, ROM  for improvements in scapular, scapulothoracic and UE control with self care, reaching, carrying, lifting, house/yardwork, driving/computer work. [x] (28236) Provided verbal/tactile cueing for activities related to improving balance, coordination, kinesthetic sense, posture, motor skill, proprioception  to assist with  scapular, scapulothoracic and UE control with self care, reaching, carrying, lifting, house/yardwork, driving/computer work.     Therapeutic Activities & NMR:    [x] (96122 or 10087) Provided verbal/tactile cueing for activities related to improving balance, coordination, kinesthetic sense, posture, motor skill, proprioception and motor activation to allow for proper function of scapular, scapulothoracic and UE control with self care, carrying, lifting, driving/computer work    Home Exercise Program:    [x] (81555) Reviewed/Progressed HEP activities related to strengthening, flexibility, endurance, ROM of scapular, scapulothoracic and UE control with self care, reaching, carrying, lifting, house/yardwork, driving/computer work  [x] (94173) Reviewed/Progressed HEP activities related to improving balance, coordination, kinesthetic sense, posture, motor skill, proprioception of scapular, scapulothoracic and UE control with self care, reaching, carrying, lifting, house/yardwork, driving/computer work      Manual Treatments:  PROM / STM / Oscillations-Mobs:  G-I, II, III, IV (PA's, Inf., Post.)  [x] (32952) Provided manual therapy to mobilize soft tissue/joints of cervical/CT, scapular GHJ and UE for the purpose of modulating pain, promoting relaxation,  increasing ROM, reducing/eliminating soft tissue swelling/inflammation/restriction, improving soft tissue extensibility and allowing for proper ROM for normal function with self care, reaching, carrying, lifting, house/yardwork, driving/computer work    ADL Training:  [x] (17896) Provided self-care/home management training related to activities of daily living and compensatory training, and/or use of adaptive equipment      Charges:  Timed Code Treatment Minutes: 40   Total Treatment Minutes: 60   Worker's Comp: Time In/Time Out     [] EVAL (LOW) 40696 (typically 20 minutes face-to-face)    [] EVAL (MOD) 16296 (typically 30 minutes face-to-face)  [] EVAL (HIGH) 23323 (typically 45 minutes face-to-face)  [] OT Re-eval (12771)       [x] Gerson [phone]) x 1    [] CWDGC(85281)  [x] NMR (25449) x  1    [] Estim (attended) (75738)   [] Manual ([id number]) x      [] US (20225)  [x] TA (70753) x 1   [] Paraffin (91193)  [] ADL  (51 [phone] 60) x     [] Splint/L code:  [] Estim (unattended) (22 474538)  [x] Fluidotherapy (97878)  [] Other:      ASSESSMENT: ROM continues to improve    GOALS:  Patient stated goal: getting hand movement back    [x] Progressing: [] Met: [] Not Met: [] Adjusted    Therapist goals for Patient:   Short Term Goals: To be achieved in: 2 weeks  1. Independent in HEP and progression per patient tolerance, in order to prevent re-injury. [x] Progressing: [] Met: [] Not Met: [] Adjusted   2.  Patient will have a decrease in pain to facilitate improvement in movement, function, and ADLs as indicated by Functional Deficits. [x] Progressing: [] Met: [] Not Met: [] Adjusted    Long Term Goals to be achieved in 6-8 weeks (through 8/27/20), including patient directed goals to address patient identified performance deficits:  1) Pt to be independent in graded HEP progression with a good level of effort and compliance. [x] Progressing: [] Met: [] Not Met: [] Adjusted   2) Pt to report a score of </= 57% on the Quick DASH disability questionnaire for increased performance with carrying, moving, and handling objects. [x] Progressing: [] Met: [] Not Met: [] Adjusted   3) Pt will demonstrate increased ROM to L wrist ext/flex >/= 40 deg each for improved independence with dressing and bathing tasks. [x] Progressing: [] Met: [] Not Met: [] Adjusted   4) Pt will demonstrate increased strength to R /pinch >/= 50% of  for improved independence with opening jars/lids. [x] Progressing: [] Met: [] Not Met: [] Adjusted   5) Pt will have a decrease in pain to 1-2/10 to facilitate return to cooking/cleaning, household duties. [] Progressing: [x] FUE:0/70/0732  [] Not Met: [] Adjusted      Overall Progression Towards Functional Goals/Treatment Progress Update:  [] Patient is progressing as expected towards functional goals listed. [x] Progression is slowed due to complexities/impairments listed - increased duration from injury (April) to surgery (June)  [] Progression has been slowed due to co-morbidities.   [] Plan just implemented, too soon to assess goals progression <30 days  [] Goals require adjustment due to lack of progress  [] Patient is not progressing as expected and requires additional follow up with physician  [] All goals are met  [] Other:     Prognosis for POC: [x] Good [] Fair  [] Poor    Patient requires continued skilled intervention: [x] Yes  [] No    Treatment/Activity Tolerance:  [x] Patient able to complete treatment  [] Patient limited by fatigue  [] Patient limited by pain    [] Patient limited by other medical complications  [] Other:                  PLAN:    [x] Continue per plan of care. [] Alter current plan (see comments above)  [] Plan of care initiated [] Hold pending MD visit [] Discharge      Electronically signed by: Jaki ALEX/L, PT, MPT, CHT, [id number], [id number]      Note: If patient does not return for scheduled/ recommended follow up visits, this note will serve as a discharge from care along with most recent update on progress.

## 2020-07-27 ENCOUNTER — HOSPITAL ENCOUNTER (OUTPATIENT)
Dept: OCCUPATIONAL THERAPY | Age: 65
Setting detail: THERAPIES SERIES
Discharge: HOME | End: 2020-07-27
Payer: MEDICARE

## 2020-07-27 PROCEDURE — 97110 THERAPEUTIC EXERCISES: CPT | Performed by: OCCUPATIONAL THERAPIST

## 2020-07-27 PROCEDURE — 97530 THERAPEUTIC ACTIVITIES: CPT | Performed by: OCCUPATIONAL THERAPIST

## 2020-07-27 PROCEDURE — 97022 WHIRLPOOL THERAPY: CPT | Performed by: OCCUPATIONAL THERAPIST

## 2020-07-27 PROCEDURE — 97140 MANUAL THERAPY 1/> REGIONS: CPT | Performed by: OCCUPATIONAL THERAPIST

## 2020-07-27 NOTE — FLOWSHEET NOTE
[facility]   E Cristal Randle,  [address], [facility]  Phone: [phone] Fax: [phone]    Occupational Therapy Treatment Note/ Progress Report:     Date:  2020    Patient Name:  Liliane Newberry    :  1955  MRN: [id number]    Medical/Treatment Diagnosis Information:  · Diagnosis: S52.552D (ICD-10-CM) - Other closed extra-articular fracture of distal end of left radius with routine healing   · Treatment Diagnosis: L wrist stiffness - X50.109     Insurance/Certification information:     Physician Information:  Referring Practitioner: Evan Rose MD  Has the plan of care been signed (Y/N):        []  Yes  [x]  No       Visit # Insurance Allowable Auth Required   8  []  Yes []  No        Is this a Progress Report:     []  Yes  [x]  No      If Yes:  Date Range for reporting period:  Beginning  Ending    Progress report will be due (10 Rx or 30 days whichever is less): 4/2/15     Recertification will be due (POC Duration  / 90 days whichever is less): 20     Date of Injury: 2020  Date of Surgery: 20 s/p ORIF L distal radius, CTR     Date of Patient follow up with Physician: 8/3/20     RESTRICTIONS/PRECAUTIONS: surgical protocol - discussed potential use of static progressing bracing with MD on 20, consideration to be given once pt is s/p 6 weeks     Latex Allergy:  [x]? No      []? Yes                    Pacemaker:  [x]? No       []? Yes      Preferred Language for Healthcare:   [x]? English       []? other:      Functional Scale: 57% (Quick DASH)                                 Date assessed:  2020     SUBJECTIVE: compliant with HEP, splint    Patient reported deficits/history of current problem: fell while at [place] home in April; wore a brace for several weeks until returned to [place] in late May     Pain Scale: 0/10        OBJECTIVE:       Date: 20   Objective Measures/Tests: S/p 4 (12595) 8' STM, retrograde massage, gentle joint mobilizations, AA/PROM  8' STM, retrograde massage, gentle joint mobilizations, AA/PROM               Neuromuscular Reeducation (88182) Cueing for exercise technique, avoidance of substitutions, wrist/hand mechanics Cueing as prior As prior               ADL Training (79614)                  HEP Training/Review Reviewed and reinforced    Added sponge squeezes Reviewed and reinforced                Splinting      Lcode:      Orthotic Mgmt, Subsequent Enc (50579)  Modified splint for increased fit and comfort Reapplied after tx   Orthotic Mgmt & Training (71254)            Other: Incision Care                                Therapeutic Exercise & NMR:  [x] (55613) Provided verbal/tactile cueing for activities related to strengthening, flexibility, endurance, ROM  for improvements in scapular, scapulothoracic and UE control with self care, reaching, carrying, lifting, house/yardwork, driving/computer work. [x] (13921) Provided verbal/tactile cueing for activities related to improving balance, coordination, kinesthetic sense, posture, motor skill, proprioception  to assist with  scapular, scapulothoracic and UE control with self care, reaching, carrying, lifting, house/yardwork, driving/computer work.     Therapeutic Activities & NMR:    [x] (01805 or 30633) Provided verbal/tactile cueing for activities related to improving balance, coordination, kinesthetic sense, posture, motor skill, proprioception and motor activation to allow for proper function of scapular, scapulothoracic and UE control with self care, carrying, lifting, driving/computer work    Home Exercise Program:    [x] (71581) Reviewed/Progressed HEP activities related to strengthening, flexibility, endurance, ROM of scapular, scapulothoracic and UE control with self care, reaching, carrying, lifting, house/yardwork, driving/computer work  [x] (26112) Reviewed/Progressed HEP activities related to improving balance, coordination, kinesthetic sense, posture, motor skill, proprioception of scapular, scapulothoracic and UE control with self care, reaching, carrying, lifting, house/yardwork, driving/computer work      Manual Treatments:  PROM / STM / Oscillations-Mobs:  G-I, II, III, IV (PA's, Inf., Post.)  [x] (85033) Provided manual therapy to mobilize soft tissue/joints of cervical/CT, scapular GHJ and UE for the purpose of modulating pain, promoting relaxation,  increasing ROM, reducing/eliminating soft tissue swelling/inflammation/restriction, improving soft tissue extensibility and allowing for proper ROM for normal function with self care, reaching, carrying, lifting, house/yardwork, driving/computer work    ADL Training:  [x] (13053) Provided self-care/home management training related to activities of daily living and compensatory training, and/or use of adaptive equipment      Charges:  Timed Code Treatment Minutes: 40   Total Treatment Minutes: 55   Worker's Comp: Time In/Time Out     [] EVAL (LOW) 53214 (typically 20 minutes face-to-face)    [] EVAL (MOD) 14618 (typically 30 minutes face-to-face)  [] EVAL (HIGH) 51495 (typically 45 minutes face-to-face)  [] OT Re-eval (61416)       [x] Gerson (91628) x 1    [] TIANG(27219)  [] NMR (66868) x      [] Estim (attended) (48138)   [x] Manual ([id number]) x   1   [] US (33289)  [x] TA (16869) x 1   [] Paraffin (89756)  [] ADL  (35465) x     [] Splint/L code:  [] Estim (unattended) (22 084843)  [x] Fluidotherapy (35758)  [] Other:      ASSESSMENT: strength progressing     GOALS:  Patient stated goal: getting hand movement back    [x] Progressing: [] Met: [] Not Met: [] Adjusted    Therapist goals for Patient:   Short Term Goals: To be achieved in: 2 weeks  1. Independent in HEP and progression per patient tolerance, in order to prevent re-injury. [x] Progressing: [] Met: [] Not Met: [] Adjusted   2.  Patient will have a decrease in pain to facilitate improvement in movement, function, and ADLs as indicated by Functional Deficits. [x] Progressing: [] Met: [] Not Met: [] Adjusted    Long Term Goals to be achieved in 6-8 weeks (through 8/27/20), including patient directed goals to address patient identified performance deficits:  1) Pt to be independent in graded HEP progression with a good level of effort and compliance. [x] Progressing: [] Met: [] Not Met: [] Adjusted   2) Pt to report a score of </= 57% on the Quick DASH disability questionnaire for increased performance with carrying, moving, and handling objects. [x] Progressing: [] Met: [] Not Met: [] Adjusted   3) Pt will demonstrate increased ROM to L wrist ext/flex >/= 40 deg each for improved independence with dressing and bathing tasks. [x] Progressing: [] Met: [] Not Met: [] Adjusted   4) Pt will demonstrate increased strength to R /pinch >/= 50% of  for improved independence with opening jars/lids. [x] Progressing: [] Met: [] Not Met: [] Adjusted   5) Pt will have a decrease in pain to 1-2/10 to facilitate return to cooking/cleaning, household duties. [] Progressing: [x] JWE:1/80/2459  [] Not Met: [] Adjusted      Overall Progression Towards Functional Goals/Treatment Progress Update:  [] Patient is progressing as expected towards functional goals listed. [x] Progression is slowed due to complexities/impairments listed - increased duration from injury (April) to surgery (June)  [] Progression has been slowed due to co-morbidities.   [] Plan just implemented, too soon to assess goals progression <30 days  [] Goals require adjustment due to lack of progress  [] Patient is not progressing as expected and requires additional follow up with physician  [] All goals are met  [] Other:     Prognosis for POC: [x] Good [] Fair  [] Poor    Patient requires continued skilled intervention: [x] Yes  [] No    Treatment/Activity Tolerance:  [x] Patient able to complete treatment  [] Patient limited by fatigue  [] Patient limited by pain    [] Patient limited by other medical complications  [] Other:                  PLAN:    [x] Continue per plan of care. [] Alter current plan (see comments above)  [] Plan of care initiated [] Hold pending MD visit [] Discharge      Electronically signed by: Jaki ALEX/L, PT, MPT, CHT, [id number], [id number]      Note: If patient does not return for scheduled/ recommended follow up visits, this note will serve as a discharge from care along with most recent update on progress.

## 2020-07-30 ENCOUNTER — HOSPITAL ENCOUNTER (OUTPATIENT)
Dept: OCCUPATIONAL THERAPY | Age: 65
Setting detail: THERAPIES SERIES
Discharge: HOME | End: 2020-07-30
Payer: MEDICARE

## 2020-07-30 PROCEDURE — 97022 WHIRLPOOL THERAPY: CPT | Performed by: OCCUPATIONAL THERAPIST

## 2020-07-30 PROCEDURE — 97530 THERAPEUTIC ACTIVITIES: CPT | Performed by: OCCUPATIONAL THERAPIST

## 2020-07-30 PROCEDURE — 97140 MANUAL THERAPY 1/> REGIONS: CPT | Performed by: OCCUPATIONAL THERAPIST

## 2020-07-30 PROCEDURE — 97110 THERAPEUTIC EXERCISES: CPT | Performed by: OCCUPATIONAL THERAPIST

## 2020-07-30 NOTE — FLOWSHEET NOTE
[address] [facility]  [address], [address] [facility], [address] [facility]  Phone: [phone] Fax: [phone]    Occupational Therapy Treatment Note/ Progress Report:     Date:  2020    Patient Name:  Danyel Crenshaw    :  1955  MRN: [id number]    Medical/Treatment Diagnosis Information:  · Diagnosis: S52.552D (ICD-10-CM) - Other closed extra-articular fracture of distal end of left radius with routine healing   · Treatment Diagnosis: L wrist stiffness - M31.011     Insurance/Certification information:     Physician Information:  Referring Practitioner: Corrinne Mulders, MD  Has the plan of care been signed (Y/N):        []  Yes  [x]  No       Visit # Insurance Allowable Auth Required   9  []  Yes []  No        Is this a Progress Report:     []  Yes  [x]  No      If Yes:  Date Range for reporting period:  Beginning  Ending    Progress report will be due (10 Rx or 30 days whichever is less): 2/3/15     Recertification will be due (POC Duration  / 90 days whichever is less): 20     Date of Injury: 2020  Date of Surgery: 20 s/p ORIF L distal radius, CTR     Date of Patient follow up with Physician: 8/3/20     RESTRICTIONS/PRECAUTIONS: surgical protocol - discussed potential use of static progressing bracing with MD on 20, consideration to be given once pt is s/p 6 weeks     Latex Allergy:  [x]? No      []? Yes                    Pacemaker:  [x]? No       []? Yes      Preferred Language for Healthcare:   [x]? English       []? other:      Functional Scale: 57% (Quick DASH)                                 Date assessed:  2020     SUBJECTIVE: compliant with HEP, splint    Patient reported deficits/history of current problem: fell while at [place] home in April; wore a brace for several weeks until returned to [place] in late May     Pain Scale: 0/10        OBJECTIVE:       Date: 20   Objective Measures/Tests: S/p 5 weeks erika S/p 5.5 weeks   ROM:Digits: tips to DPFC   2cm IF/LF  2.5cm RF  1.5cm SF      Passively - 1cm IF, 0cm LF-SF As prior   Thumb tip to DPFC   1.25cm    Wrist ext/flex            rd/ud   45/41 45/43   FA sup/pron   70/80 70/85   Strength: R 55   L 16  13      5  14      3  10      2   L 16        Observations:   Small pocket of localized edema over dorsal wrist between splint straps   Other:               MODALITIES:     Fluidotherapy (33999) 15' 16'   Estim (88319/93859)     Paraffin (32090)     US (32111)     Iontophoresis (57989)     Hot Pack     Cold Pack          INTERVENTIONS:     Therapeutic Exercise (13652)     AROM 10x2 each wrist/hand tenodesis, RD/UD, FA rotation    10x full fist, flat fist    10x wrist circles 10x2 each wrist/hand tenodesis, RD/UD, FA rotation    10x full fist, flat fist    10x wrist circles      PROM 10x2 wrist, FA PROM    10x intrinsic stretching, composite flexion of digits    10x wrist ext, flex into power web (composite stretching)    Prayer stretch x 10 10x intrinsic stretching, composite flexion of digits    10x wrist ext, flex into power web (composite stretching)             Therapeutic Activity (55059)     Page Turning     Hand [place] 10x3    FA Bar     Sponge exercises Use of yellow hand gripper to  foam blocks x 10; light tea strainer to  foam blocks x 10 x3 (no modification needed) Use of red hand gripper to  med foam blocks x 20x2   Putty  Use of small cone to \"cut cookies\" x 10   Wrist Wrap     Bimanual Activities  Folding towels x 3     Small objects     Wrist maze     Manual Therapy (67240) 8' STM, retrograde massage, gentle joint mobilizations, AA/PROM 10' STM, retrograde massage, gentle joint mobilizations, AA/PROM; issued size D compression sleeves for edema control             Neuromuscular Reeducation (21441) As prior As prior             ADL Training (73510)               HEP Training/Review               Splinting     Lcode:     Orthotic Mgmt, reducing/eliminating soft tissue swelling/inflammation/restriction, improving soft tissue extensibility and allowing for proper ROM for normal function with self care, reaching, carrying, lifting, house/yardwork, driving/computer work    ADL Training:  [x] (00373) Provided self-care/home management training related to activities of daily living and compensatory training, and/or use of adaptive equipment      Charges:  Timed Code Treatment Minutes: 39   Total Treatment Minutes: 55   Worker's Comp: Time In/Time Out     [] EVAL (LOW) 37570 (typically 20 minutes face-to-face)    [] EVAL (MOD) 43044 (typically 30 minutes face-to-face)  [] EVAL (HIGH) 36294 (typically 45 minutes face-to-face)  [] OT Re-eval (38670)       [x] Gerson [phone] x 1    [] DJLGG(45681)  [] NMR (46176) x      [] Estim (attended) (90782)   [x] Manual ([address] [facility]) x   1   [] US (06210)  [x] TA (48755) x 1   [] Paraffin (40968)  [] ADL  (78172) x     [] Splint/L code:  [] Estim (unattended) ([phone])  [x] Fluidotherapy (69643)  [] Other:      ASSESSMENT: continue to reinforce HEP frequency for progression of ROM    GOALS:  Patient stated goal: getting hand movement back    [x] Progressing: [] Met: [] Not Met: [] Adjusted    Therapist goals for Patient:   Short Term Goals: To be achieved in: 2 weeks  1. Independent in HEP and progression per patient tolerance, in order to prevent re-injury. [x] Progressing: [] Met: [] Not Met: [] Adjusted   2. Patient will have a decrease in pain to facilitate improvement in movement, function, and ADLs as indicated by Functional Deficits. [x] Progressing: [] Met: [] Not Met: [] Adjusted    Long Term Goals to be achieved in 6-8 weeks (through 8/27/20), including patient directed goals to address patient identified performance deficits:  1) Pt to be independent in graded HEP progression with a good level of effort and compliance.   [x] Progressing: [] Met: [] Not Met: [] Adjusted   2) Pt to report a score of </= 57% on the Quick DASH disability questionnaire for increased performance with carrying, moving, and handling objects. [x] Progressing: [] Met: [] Not Met: [] Adjusted   3) Pt will demonstrate increased ROM to L wrist ext/flex >/= 40 deg each for improved independence with dressing and bathing tasks. [x] Progressing: [] Met: [] Not Met: [] Adjusted   4) Pt will demonstrate increased strength to R /pinch >/= 50% of  for improved independence with opening jars/lids. [x] Progressing: [] Met: [] Not Met: [] Adjusted   5) Pt will have a decrease in pain to 1-2/10 to facilitate return to cooking/cleaning, household duties. [] Progressing: [x] QLX:1/25/3652  [] Not Met: [] Adjusted      Overall Progression Towards Functional Goals/Treatment Progress Update:  [] Patient is progressing as expected towards functional goals listed. [x] Progression is slowed due to complexities/impairments listed - increased duration from injury (April) to surgery (June)  [] Progression has been slowed due to co-morbidities. [] Plan just implemented, too soon to assess goals progression <30 days  [] Goals require adjustment due to lack of progress  [] Patient is not progressing as expected and requires additional follow up with physician  [] All goals are met  [] Other:     Prognosis for POC: [x] Good [] Fair  [] Poor    Patient requires continued skilled intervention: [x] Yes  [] No    Treatment/Activity Tolerance:  [x] Patient able to complete treatment  [] Patient limited by fatigue  [] Patient limited by pain    [] Patient limited by other medical complications  [] Other:                  PLAN:    [x] Continue per plan of care, progress note to MD at next visit     [] Alter current plan (see comments above)  [] Plan of care initiated [] Hold pending MD visit [] Discharge      Electronically signed by:   Jaki Bell OTR/L, PT, MPT, CHT, [id number], [id number]      Note: If patient does not return for scheduled/ recommended follow up visits, this note will serve as a discharge from care along with most recent update on progress.

## 2020-08-03 ENCOUNTER — HOSPITAL ENCOUNTER (OUTPATIENT)
Dept: OCCUPATIONAL THERAPY | Age: 65
Setting detail: THERAPIES SERIES
Discharge: HOME | End: 2020-08-03
Payer: MEDICARE

## 2020-08-03 ENCOUNTER — OFFICE VISIT (OUTPATIENT)
Dept: ORTHOPEDIC SURGERY | Age: 65
End: 2020-08-03

## 2020-08-03 VITALS — BODY MASS INDEX: 22.86 KG/M2 | HEIGHT: 63 IN | WEIGHT: 129 LBS

## 2020-08-03 PROCEDURE — 97140 MANUAL THERAPY 1/> REGIONS: CPT | Performed by: OCCUPATIONAL THERAPIST

## 2020-08-03 PROCEDURE — 97110 THERAPEUTIC EXERCISES: CPT | Performed by: OCCUPATIONAL THERAPIST

## 2020-08-03 PROCEDURE — 99024 POSTOP FOLLOW-UP VISIT: CPT | Performed by: ORTHOPAEDIC SURGERY

## 2020-08-03 PROCEDURE — 97530 THERAPEUTIC ACTIVITIES: CPT | Performed by: OCCUPATIONAL THERAPIST

## 2020-08-03 PROCEDURE — 97022 WHIRLPOOL THERAPY: CPT | Performed by: OCCUPATIONAL THERAPIST

## 2020-08-03 NOTE — PROGRESS NOTES
Assessment: Overall excellent and range of motion after basically a reconstructive osteotomy 6 weeks after severe distal radius fracture    Treatment Plan: She is going to continue in therapy because she still making good progress and feels like she is really still benefiting I did discuss with Nan Rodríguez that we may need to remove her plate in the future    As I could not quite get the distal radius all the way back up to the volar buttress plate so if she gets irritation of the flexor tendons we may have to remove it    Return in about 6 weeks (around 9/14/2020) for X-ray next visit. Chief Complaint:  Follow-up (chk left distal radius fracture)      History of Present Illness  Patel Mckee is a 76 y.o. female. Location: left wrist Severity: mild post op pain Duration: 4/30/20  Modifying factors: 6/23/20 surgery, has been doing OT which helps  Associated symptoms: none    Medical History    Current Outpatient Medications on File Prior to Visit   Medication Sig Dispense Refill    lisinopril-hydroCHLOROthiazide (PRINZIDE;ZESTORETIC) 20-12.5 MG per tablet Take 1 tablet by mouth daily      Melatonin 10 MG TABS Take by mouth nightly      CALCIUM-MAGNESIUM PO Take by mouth daily      PSYLLIUM HUSK PO Take by mouth daily      Probiotic Product (PROBIOTIC PO) Take by mouth daily       Milk Thistle 1000 MG CAPS Take by mouth daily       Multiple Vitamins-Minerals (THERAPEUTIC MULTIVITAMIN-MINERALS) tablet Take 1 tablet by mouth daily.  Cholecalciferol (VITAMIN D PO) Take by mouth daily       Omega-3 Fatty Acids (FISH OIL PO) Take by mouth daily        No current facility-administered medications on file prior to visit.       Past Medical History:   Diagnosis Date    Hypertension      Allergies   Allergen Reactions    Penicillins     Sulfa Antibiotics      Social History     Socioeconomic History    Marital status: Single     Spouse name: Not on file    Number of children: Not on file    Years of education: Not on file    Highest education level: Not on file   Occupational History    Not on file   Social Needs    Financial resource strain: Not on file    Food insecurity     Worry: Not on file     Inability: Not on file    Transportation needs     Medical: Not on file     Non-medical: Not on file   Tobacco Use    Smoking status: Never Smoker    Smokeless tobacco: Never Used   Substance and Sexual Activity    Alcohol use: Yes     Comment: 10 drinks per week    Drug use: No    Sexual activity: Not on file   Lifestyle    Physical activity     Days per week: Not on file     Minutes per session: Not on file    Stress: Not on file   Relationships    Social connections     Talks on phone: Not on file     Gets together: Not on file     Attends [employer]ism service: Not on file     Active member of club or organization: Not on file     Attends meetings of clubs or organizations: Not on file     Relationship status: Not on file    Intimate partner violence     Fear of current or ex partner: Not on file     Emotionally abused: Not on file     Physically abused: Not on file     Forced sexual activity: Not on file   Other Topics Concern    Not on file   Social History Narrative    Not on file     Family History   Adopted: Yes       Patient's medications, allergies, past medical, surgical, social and family histories were reviewed and updated as appropriate. Review of Systems  Pertinent items are noted in HPI      Vital Signs  Vitals:    08/03/20 1550   Weight: 129 lb (58.5 kg)   Height: 5' 3\" (1.6 m)     Body mass index is 22.85 kg/m².      Physical Exam    Hand Examination: Vipin Zarco is going down nicely she now can touch her distal palmar crease with all 4 digits range of motion as listed in her therapy note    Additional Comments:     Additional Examinations:  X-Ray Findings: PA lateral and oblique x-rays of the left wrist show good restoration of radial length about 5 degrees of residual dorsal tilt all hardware remains in good position  Additional Diagnostic Test Findings:    Office Procedures: This dictation was performed with a verbal recognition program. It is possible that there are still dictated errors within this office note. All efforts were made to ensure that this office note is accurate. Orders Placed This Encounter   Procedures    XR WRIST LEFT (MIN 3 VIEWS)     Standing Status:   Future     Number of Occurrences:   1     Standing Expiration Date:   8/3/2021       Attestation: I have reviewed the chief complaint and history of present illness (including ROS and PFSH) and vital documentation by my staff and I agree with their documentation and have added where applicable.

## 2020-08-03 NOTE — OP NOTE
[id number]     Physician Recommendations:  [] Follow treatment plan as above [] Discontinue hand therapy  [] Change plan to: _______________________________________________________________    [x] ROBE ([phone])  [] EGO ([phone])  [] AND ([phone])          Fax   [phone]                       Fax  [phone]                  Fax  [phone]              [] [address] Janie Plunkett ([phone])  [] CBC ([phone])  [] ALISA ([phone])       Fax   [phone]                   Fax  [phone]                        Fax   [phone]

## 2020-08-03 NOTE — FLOWSHEET NOTE
0/10        OBJECTIVE:       Date: 7/27/20 7/30/20 8/3/20   Objective Measures/Tests: S/p 5 weeks erika S/p 5.5 weeks    ROM:Digits: tips to DPFC   2cm IF/LF  2.5cm RF  1.5cm SF      Passively - 1cm IF, 0cm LF-SF As prior See progress note to MD   Thumb tip to DPFC   1.25cm     Wrist ext/flex            rd/ud   45/41 45/43    FA sup/pron   70/80 70/85    Strength: R 55   L 16  13      5  14      3  10      2   L 16          Observations:   Small pocket of localized edema over dorsal wrist between splint straps    Other:                  MODALITIES:      Fluidotherapy (17612) 15' 16' 17'   Estim (36666/93395)      Paraffin (75007)      US (69792)      Iontophoresis (07812)      Hot Pack      Cold Pack            INTERVENTIONS:      Therapeutic Exercise (40550)      AROM 10x2 each wrist/hand tenodesis, RD/UD, FA rotation    10x full fist, flat fist    10x wrist circles 10x2 each wrist/hand tenodesis, RD/UD, FA rotation    10x full fist, flat fist    10x wrist circles    10x2 each wrist/hand tenodesis, RD/UD, FA rotation    10x full fist, flat fist    10x wrist circles   PROM 10x2 wrist, FA PROM    10x intrinsic stretching, composite flexion of digits    10x wrist ext, flex into power web (composite stretching)    Prayer stretch x 10 10x intrinsic stretching, composite flexion of digits    10x wrist ext, flex into power web (composite stretching) 10x intrinsic stretching, composite flexion of digits    10x wrist ext, flex into power web (composite stretching)               Therapeutic Activity (48283)      Page Turning      Hand [place] 10x3     FA Bar   10x, no added resistance   Sponge exercises Use of yellow hand gripper to  foam blocks x 10; light tea strainer to  foam blocks x 10 x3 (no modification needed) Use of red hand gripper to  med foam blocks x 20x2    Putty  Use of small cone to \"cut cookies\" x 10    Wrist Wrap   6oz, ~10x each wrist ext and flex in FA sup and pron   Bimanual Activities Folding towels x 3      Therabar   Yellow, 10x each B sup/pron   Wrist maze      Manual Therapy (81074) 8' STM, retrograde massage, gentle joint mobilizations, AA/PROM 10' STM, retrograde massage, gentle joint mobilizations, AA/PROM; issued size D compression sleeves for edema control 8' STM, retrograde massage, gentle joint mobilizations, AA/PROM               Neuromuscular Reeducation (91522) As prior As prior Cueing for exercise technique, avoidance of substitutions               ADL Training (20061)                  HEP Training/Review                  Splinting      Lcode:      Orthotic Mgmt, Subsequent Enc (67242) Reapplied after tx Reapplied after tx; straps adjusted to minimize localized edema    Orthotic Mgmt & Training (18807)            Other: Incision Care                                Therapeutic Exercise & NMR:  [x] (98905) Provided verbal/tactile cueing for activities related to strengthening, flexibility, endurance, ROM  for improvements in scapular, scapulothoracic and UE control with self care, reaching, carrying, lifting, house/yardwork, driving/computer work. [x] (19002) Provided verbal/tactile cueing for activities related to improving balance, coordination, kinesthetic sense, posture, motor skill, proprioception  to assist with  scapular, scapulothoracic and UE control with self care, reaching, carrying, lifting, house/yardwork, driving/computer work.     Therapeutic Activities & NMR:    [x] (60499 or 05312) Provided verbal/tactile cueing for activities related to improving balance, coordination, kinesthetic sense, posture, motor skill, proprioception and motor activation to allow for proper function of scapular, scapulothoracic and UE control with self care, carrying, lifting, driving/computer work    Home Exercise Program:    [x] (22637) Reviewed/Progressed HEP activities related to strengthening, flexibility, endurance, ROM of scapular, scapulothoracic and UE control with self care, reaching, carrying, lifting, house/yardwork, driving/computer work  [x] (80823) Reviewed/Progressed HEP activities related to improving balance, coordination, kinesthetic sense, posture, motor skill, proprioception of scapular, scapulothoracic and UE control with self care, reaching, carrying, lifting, house/yardwork, driving/computer work      Manual Treatments:  PROM / STM / Oscillations-Mobs:  G-I, II, III, IV (PA's, Inf., Post.)  [x] (46230) Provided manual therapy to mobilize soft tissue/joints of cervical/CT, scapular GHJ and UE for the purpose of modulating pain, promoting relaxation,  increasing ROM, reducing/eliminating soft tissue swelling/inflammation/restriction, improving soft tissue extensibility and allowing for proper ROM for normal function with self care, reaching, carrying, lifting, house/yardwork, driving/computer work    ADL Training:  [x] (41094) Provided self-care/home management training related to activities of daily living and compensatory training, and/or use of adaptive equipment      Charges:  Timed Code Treatment Minutes: 38   Total Treatment Minutes: 55   Worker's Comp: Time In/Time Out     [] EVAL (LOW) 15969 (typically 20 minutes face-to-face)    [] EVAL (MOD) 13472 (typically 30 minutes face-to-face)  [] EVAL (HIGH) [phone] (typically 45 minutes face-to-face)  [] OT Re-eval (55289)       [x] Gerson [phone] x 1    [] NRJUJ(90938)  [] NMR (68789) x      [] Estim (attended) (87149)   [x] Manual ([phone]) x   1   [] US (73092)  [x] TA (45765) x 1   [] Paraffin (43394)  [] ADL  ([phone]) x     [] Splint/L code:  [] Estim (unattended) (22 [phone])  [x] Fluidotherapy (26884)  [] Other:      ASSESSMENT: ROM progressing, notably improvement functional independence reported    GOALS:  Patient stated goal: getting hand movement back    [x] Progressing: [] Met: [] Not Met: [] Adjusted    Therapist goals for Patient:   Short Term Goals: To be achieved in: 2 weeks  1.  Independent in HEP and progression per patient tolerance, in order to prevent re-injury. [x] Progressing: [] Met: [] Not Met: [] Adjusted   2. Patient will have a decrease in pain to facilitate improvement in movement, function, and ADLs as indicated by Functional Deficits. [x] Progressing: [] Met: [] Not Met: [] Adjusted    Long Term Goals to be achieved in 6-8 weeks (through 20), including patient directed goals to address patient identified performance deficits:  1) Pt to be independent in graded HEP progression with a good level of effort and compliance. [x] Progressing: [] Met: [] Not Met: [] Adjusted   2) Pt to report a score of </=  25% on the Quick DASH disability questionnaire for increased performance with carrying, moving, and handling objects. [] Progressing: [x] OBZ:[id number]  [] Not Met: [] Adjusted   3) Pt will demonstrate increased ROM to L wrist ext/flex >/= 40 deg each for improved independence with dressing and bathing tasks. [] Progressing: [x] PW  [] Not Met: [] Adjusted   4) Pt will demonstrate increased strength to R /pinch >/= 50% of  for improved independence with opening jars/lids. [x] Progressing: [] Met: [] Not Met: [] Adjusted   5) Pt will have a decrease in pain to 1-2/10 to facilitate return to cooking/cleaning, household duties. [] Progressing: [x] YFO:  [] Not Met: [] Adjusted      Overall Progression Towards Functional Goals/Treatment Progress Update:  [] Patient is progressing as expected towards functional goals listed. [x] Progression is slowed due to complexities/impairments listed - increased duration from injury (April) to surgery ()  [] Progression has been slowed due to co-morbidities.   [] Plan just implemented, too soon to assess goals progression <30 days  [] Goals require adjustment due to lack of progress  [] Patient is not progressing as expected and requires additional follow up with physician  [] All goals are met  [x] Other: goals 2-3 met    Prognosis for POC: [x] Good [] Fair  [] Poor    Patient requires continued skilled intervention: [x] Yes  [] No    Treatment/Activity Tolerance:  [x] Patient able to complete treatment  [] Patient limited by fatigue  [] Patient limited by pain    [] Patient limited by other medical complications  [] Other:                  PLAN:    [x] Continue per plan of care    [] Alter current plan (see comments above)  [] Plan of care initiated [] Hold pending MD visit [] Discharge      Electronically signed by: Jaki ALEX/L, PT, MPT, CHT, [id number], [id number]      Note: If patient does not return for scheduled/ recommended follow up visits, this note will serve as a discharge from care along with most recent update on progress.

## 2020-08-06 ENCOUNTER — HOSPITAL ENCOUNTER (OUTPATIENT)
Dept: OCCUPATIONAL THERAPY | Age: 65
Setting detail: THERAPIES SERIES
Discharge: HOME | End: 2020-08-06
Payer: MEDICARE

## 2020-08-06 PROCEDURE — 97022 WHIRLPOOL THERAPY: CPT | Performed by: OCCUPATIONAL THERAPIST

## 2020-08-06 PROCEDURE — 97110 THERAPEUTIC EXERCISES: CPT | Performed by: OCCUPATIONAL THERAPIST

## 2020-08-06 PROCEDURE — 97530 THERAPEUTIC ACTIVITIES: CPT | Performed by: OCCUPATIONAL THERAPIST

## 2020-08-06 PROCEDURE — 97140 MANUAL THERAPY 1/> REGIONS: CPT | Performed by: OCCUPATIONAL THERAPIST

## 2020-08-06 NOTE — FLOWSHEET NOTE
[facility]  [address],  [address], [address]  Phone: [phone] Fax: [phone]    Occupational Therapy Treatment Note/ Progress Report:     Date:  2020    Patient Name:  Melody Graves    :  1955  MRN: [id number]    Medical/Treatment Diagnosis Information:  · Diagnosis: S52.552D (ICD-10-CM) - Other closed extra-articular fracture of distal end of left radius with routine healing   · Treatment Diagnosis: L wrist stiffness - E32.441     Insurance/Certification information:     Physician Information:  Referring Practitioner: Anna Rueda MD  Has the plan of care been signed (Y/N):        []  Yes  [x]  No       Visit # Insurance Allowable Auth Required   11  []  Yes []  No        Is this a Progress Report:     []  Yes  [x]  No      If Yes:  Date Range for reporting period:  Beginning  Ending    Progress report will be due (10 Rx or 30 days whichever is less):     Recertification will be due (POC Duration  / 90 days whichever is less): 20     Date of Injury: 2020  Date of Surgery: 20 s/p ORIF L distal radius, CTR     Date of Patient follow up with Physician: 8/3/20     RESTRICTIONS/PRECAUTIONS: surgical protocol      Latex Allergy:  [x]? No      []? Yes                    Pacemaker:  [x]? No       []? Yes      Preferred Language for Healthcare:   [x]? English       []? other:      Functional Scale: 13% (Quick DASH)                                 Date assessed:  8/3/2020     SUBJECTIVE: compliant with HEP, splint; seen by MD last week    Patient reported deficits/history of current problem: fell while at [place] home in April; wore a brace for several weeks until returned to [place] in late May     Pain Scale: 0/10        OBJECTIVE:       Date: 7/30/20 8/3/20 8/6/20   Objective Measures/Tests: S/p 5.5 weeks  S/p 6.5 weeks   ROM:Digits: tips to [facility]   As prior See progress note to MD    Thumb tip to [facility]        Wrist ext/flex            rd/ud   45/43     FA sup/pron   70/85     Strength: L 16           Observations:  Small pocket of localized edema over dorsal wrist between splint straps  Guarded posturing of L wrist/hand observed during functional performance    Mild increased edema noted digits (distal to compression sleeve)   Other:                  MODALITIES:      Fluidotherapy (79773) 16' 17' 18'   Estim (17951/66362)      Paraffin (16008)      US (65648)      Iontophoresis (56607)      Hot Pack      Cold Pack            INTERVENTIONS:      Therapeutic Exercise (49966)      AROM 10x2 each wrist/hand tenodesis, RD/UD, FA rotation    10x full fist, flat fist    10x wrist circles    10x2 each wrist/hand tenodesis, RD/UD, FA rotation    10x full fist, flat fist    10x wrist circles 10x each wrist ext/flex, FA rotation    10x2 flat fist    10x wrist circles   PROM 10x intrinsic stretching, composite flexion of digits    10x wrist ext, flex into power web (composite stretching) 10x intrinsic stretching, composite flexion of digits    10x wrist ext, flex into power web (composite stretching) 10x intrinsic stretching, composite flexion of digits    10x wrist ext, flex into power web (composite stretching)     Strengthening   Yellow tband, 10x2 wrist ext, flex         Therapeutic Activity (26924)      Foam Blocks   Use of light tea strainer (wrapped with rband for increased resistance) to  FB x 50   Thumb Oppociser   10x2   FA Bar  10x, no added resistance 8oz, mid bar x 10   Sponge exercises Use of red hand gripper to  med foam blocks x 20x2     Putty Use of small cone to \"cut cookies\" x 10     Wrist Wrap  6oz, ~10x each wrist ext and flex in FA sup and pron    Bimanual Activities Folding towels x 3       Therabar  Yellow, 10x each B sup/pron    Thumb Exerciser   10x2   Manual Therapy (66723) 10' STM, retrograde massage, gentle joint mobilizations, AA/PROM; issued size D compression sleeves for edema control 8' STM, retrograde massage, gentle joint mobilizations, AA/PROM 10' STM, retrograde massage, gentle joint mobilizations, AA/PROM; issued compression glove for edema control at night/prn               Neuromuscular Reeducation (24115) As prior Cueing for exercise technique, avoidance of substitutions Frequent cueing for exercise technique, avoidance of substitutions               ADL Training (15526)   Reinforced incorporation of L into light daily tasks, avoidance of substitutions, guarded posturing               HEP Training/Review  Access Code: [email or web address]   URL: [employer]/   Date: 08/06/2020   Prepared by:  Jaki Bell     Exercises   Wrist Stretches - 3-5 reps - 5-10 sec hold - 3-4x daily - 7x weekly   Prayer Stretch - 3-5 reps - 5-10 sec hold - 3-4x daily - 7x weekly   Finger Composite PROM - 10 reps - 3-4x daily - 7x weekly   Composite Thumb PROM - 10 reps - 3-4x daily - 7x weekly   Full Fist - 10 reps - 3-4x daily - 7x weekly   Thumb Extension/Flexion - 10 reps - 3-4x daily - 7x weekly   Wrist Extension/Flexion - Tenodesis - 10 reps - 3-4x daily - 7x weekly   Wrist Radial/Ulnar Deviation - 10 reps - 3-4x daily - 7x weekly   Forearm Rotation - 10 reps - 3-4x daily - 7x weekly   Sponge squeezes - 10-20 reps - 1-2x daily - 7x weekly   Pinch strengthening - 10 reps - 1-2x daily - 7x weekly   Seated Wrist Flexion with Anchored Resistance - 10 reps - 1-2 sets - 1-2x daily - 7x weekly   Seated Wrist Extension with Anchored Resistance - 10 reps - 1-2 sets - 1-2x daily - 7x weekly   Resistive Forearm Rotation - 10-20 reps - 1-2x daily - 7x weekly    Added wrist ext/flex tband exercises, pinching (with sponge), and FA rotation with hammer               Splinting      Lcode:      Orthotic Mgmt, Subsequent Enc (56639) Reapplied after tx; straps adjusted to minimize localized edema     Orthotic Mgmt & Training (53992)            Other: Incision Care Therapeutic Exercise & NMR:  [x] (04912) Provided verbal/tactile cueing for activities related to strengthening, flexibility, endurance, ROM  for improvements in scapular, scapulothoracic and UE control with self care, reaching, carrying, lifting, house/yardwork, driving/computer work. [x] (12144) Provided verbal/tactile cueing for activities related to improving balance, coordination, kinesthetic sense, posture, motor skill, proprioception  to assist with  scapular, scapulothoracic and UE control with self care, reaching, carrying, lifting, house/yardwork, driving/computer work.     Therapeutic Activities & NMR:    [x] (38806 or 51704) Provided verbal/tactile cueing for activities related to improving balance, coordination, kinesthetic sense, posture, motor skill, proprioception and motor activation to allow for proper function of scapular, scapulothoracic and UE control with self care, carrying, lifting, driving/computer work    Home Exercise Program:    [x] (84195) Reviewed/Progressed HEP activities related to strengthening, flexibility, endurance, ROM of scapular, scapulothoracic and UE control with self care, reaching, carrying, lifting, house/yardwork, driving/computer work  [x] (97592) Reviewed/Progressed HEP activities related to improving balance, coordination, kinesthetic sense, posture, motor skill, proprioception of scapular, scapulothoracic and UE control with self care, reaching, carrying, lifting, house/yardwork, driving/computer work      Manual Treatments:  PROM / STM / Oscillations-Mobs:  G-I, II, III, IV (PA's, Inf., Post.)  [x] (16965) Provided manual therapy to mobilize soft tissue/joints of cervical/CT, scapular GHJ and UE for the purpose of modulating pain, promoting relaxation,  increasing ROM, reducing/eliminating soft tissue swelling/inflammation/restriction, improving soft tissue extensibility and allowing for proper ROM for normal function with self care, reaching, carrying, will demonstrate increased ROM to L wrist ext/flex >/= 40 deg each for improved independence with dressing and bathing tasks. [] Progressing: [x] VFI:3/3/1456  [] Not Met: [] Adjusted   4) Pt will demonstrate increased strength to R /pinch >/= 50% of  for improved independence with opening jars/lids. [x] Progressing: [] Met: [] Not Met: [] Adjusted   5) Pt will have a decrease in pain to 1-2/10 to facilitate return to cooking/cleaning, household duties. [] Progressing: [x] MIY:8/62/3264  [] Not Met: [] Adjusted      Overall Progression Towards Functional Goals/Treatment Progress Update:  [] Patient is progressing as expected towards functional goals listed. [x] Progression is slowed due to complexities/impairments listed - increased duration from injury (April) to surgery (June)  [] Progression has been slowed due to co-morbidities. [] Plan just implemented, too soon to assess goals progression <30 days  [] Goals require adjustment due to lack of progress  [] Patient is not progressing as expected and requires additional follow up with physician  [] All goals are met  [x] Other: goals 2-3 met    Prognosis for POC: [x] Good [] Fair  [] Poor    Patient requires continued skilled intervention: [x] Yes  [] No    Treatment/Activity Tolerance:  [x] Patient able to complete treatment  [] Patient limited by fatigue  [] Patient limited by pain    [] Patient limited by other medical complications  [] Other:                  PLAN:    [x] Continue per plan of care    [] Alter current plan (see comments above)  [] Plan of care initiated [] Hold pending MD visit [] Discharge      Electronically signed by: Jaki Bell OTR/L, PT, MPT, CHT, [id number], [id number]      Note: If patient does not return for scheduled/ recommended follow up visits, this note will serve as a discharge from care along with most recent update on progress.

## 2020-08-11 ENCOUNTER — HOSPITAL ENCOUNTER (OUTPATIENT)
Dept: OCCUPATIONAL THERAPY | Age: 65
Setting detail: THERAPIES SERIES
Discharge: HOME | End: 2020-08-11
Payer: MEDICARE

## 2020-08-11 PROCEDURE — 97110 THERAPEUTIC EXERCISES: CPT | Performed by: OCCUPATIONAL THERAPIST

## 2020-08-11 PROCEDURE — 97022 WHIRLPOOL THERAPY: CPT | Performed by: OCCUPATIONAL THERAPIST

## 2020-08-11 PROCEDURE — 97530 THERAPEUTIC ACTIVITIES: CPT | Performed by: OCCUPATIONAL THERAPIST

## 2020-08-11 NOTE — FLOWSHEET NOTE
[facility]  [address], [address] [facility], [address] [facility]  Phone: [phone] Fax: [phone]    Occupational Therapy Treatment Note/ Progress Report:     Date:  2020    Patient Name:  Patito Moore    :  1955  MRN: [id number]    Medical/Treatment Diagnosis Information:  · Diagnosis: S52.552D (ICD-10-CM) - Other closed extra-articular fracture of distal end of left radius with routine healing   · Treatment Diagnosis: L wrist stiffness - B90.437     Insurance/Certification information:     Physician Information:  Referring Practitioner: Jaswinder Guzmán MD  Has the plan of care been signed (Y/N):        []  Yes  [x]  No       Visit # Insurance Allowable Auth Required   12  []  Yes []  No        Is this a Progress Report:     []  Yes  [x]  No      If Yes:  Date Range for reporting period:  Beginning  Ending    Progress report will be due (10 Rx or 30 days whichever is less):     Recertification will be due (POC Duration  / 90 days whichever is less): 20     Date of Injury: 2020  Date of Surgery: 20 s/p ORIF L distal radius, CTR     Date of Patient follow up with Physician: 8/3/20     RESTRICTIONS/PRECAUTIONS: surgical protocol      Latex Allergy:  [x]? No      []? Yes                    Pacemaker:  [x]? No       []? Yes      Preferred Language for Healthcare:   [x]? English       []? other:      Functional Scale: 13% (Quick DASH)                                 Date assessed:  8/3/2020     SUBJECTIVE: compliant with HEP; reports persistent edema in digits, stiffness; admits limited compliance with HEP, but using hand more for activities    Patient reported deficits/history of current problem: fell while at [place] home in April; wore a brace for several weeks until returned to [place] in late May     Pain Scale: 0/10        OBJECTIVE:       Date: 8/3/20 8/6/20 8/11/20   Objective Measures/Tests:  S/p 6.5 weeks S/p 7 weeks     ROM:Digits: tips to [facility]   See progress note to MD  1.5cm IF/LF  2cm RF  1cm SF   Thumb tip to DP[facility]     1cm   Wrist ext/flex            rd/ud     49/45   FA sup/pron        Strength:   L 25         Observations:   Guarded posturing of L wrist/hand observed during functional performance    Mild increased edema noted digits (distal to compression sleeve) Persistent guarded position of L wrist/hand observed   Other:                  MODALITIES:      Fluidotherapy (89923) 17' 18' 15'   Estim (81850/19447)      Paraffin (98364)      US (08064)      Iontophoresis (11161)      Hot Pack      Cold Pack            INTERVENTIONS:      Therapeutic Exercise (34561)      AROM 10x2 each wrist/hand tenodesis, RD/UD, FA rotation    10x full fist, flat fist    10x wrist circles 10x each wrist ext/flex, FA rotation    10x2 flat fist    10x wrist circles 10x each wrist ext/flex, FA rotation    10x full fist, flat fist   PROM 10x intrinsic stretching, composite flexion of digits    10x wrist ext, flex into power web (composite stretching) 10x intrinsic stretching, composite flexion of digits    10x wrist ext, flex into power web (composite stretching)   10x intrinsic stretching, composite flexion of digits   Strengthening  Yellow tband, 10x2 wrist ext, flex          Therapeutic Activity (53897)      Foam Blocks  Use of light tea strainer (wrapped with rband for increased resistance) to  FB x 50    Thumb Oppociser  10x2    FA Bar 10x, no added resistance 8oz, mid bar x 10    Sponge exercises      Wrist Wrap 6oz, ~10x each wrist ext and flex in FA sup and pron     Bimanual Activities   Use of rolling pin to flatten yellow putty x ~20    Use of small cones in B hands to \"cut cookies\" x 30 - cueing for avoidance of substitution patterns L    Use of B tea strainers to /release foam blocks x 20 - cueing for L to avoid substitutions   Therabar Yellow, 10x each B sup/pron     Thumb Exerciser  10x2    Manual Therapy disability questionnaire for increased performance with carrying, moving, and handling objects. [] Progressing: [x] [id number]  [] Not Met: [] Adjusted   3) Pt will demonstrate increased ROM to L wrist ext/flex >/= 40 deg each for improved independence with dressing and bathing tasks. [] Progressing: [x] GNR:8026  [] Not Met: [] Adjusted   4) Pt will demonstrate increased strength to R /pinch >/= 50% of  for improved independence with opening jars/lids. [x] Progressing: [] Met: [] Not Met: [] Adjusted   5) Pt will have a decrease in pain to 1-2/10 to facilitate return to cooking/cleaning, household duties. [] Progressing: [x] HAK:8359  [] Not Met: [] Adjusted      Overall Progression Towards Functional Goals/Treatment Progress Update:  [] Patient is progressing as expected towards functional goals listed. [x] Progression is slowed due to complexities/impairments listed - increased duration from injury (April) to surgery ()  [] Progression has been slowed due to co-morbidities. [] Plan just implemented, too soon to assess goals progression <30 days  [] Goals require adjustment due to lack of progress  [] Patient is not progressing as expected and requires additional follow up with physician  [] All goals are met  [] Other:     Prognosis for POC: [x] Good [] Fair  [] Poor    Patient requires continued skilled intervention: [x] Yes  [] No    Treatment/Activity Tolerance:  [x] Patient able to complete treatment  [] Patient limited by fatigue  [] Patient limited by pain    [] Patient limited by other medical complications  [] Other:                  PLAN:    [x] Continue per plan of care    [] Alter current plan (see comments above)  [] Plan of care initiated [] Hold pending MD visit [] Discharge      Electronically signed by:   Jaki Bell OTR/L, PT, MPT, CHT, [id number], [id number]      Note: If patient does not return for scheduled/ recommended follow up visits, this note will serve as a discharge from care along with most recent update on progress.

## 2020-08-13 ENCOUNTER — HOSPITAL ENCOUNTER (OUTPATIENT)
Dept: OCCUPATIONAL THERAPY | Age: 65
Setting detail: THERAPIES SERIES
Discharge: HOME | End: 2020-08-13
Payer: MEDICARE

## 2020-08-13 PROCEDURE — 97022 WHIRLPOOL THERAPY: CPT | Performed by: OCCUPATIONAL THERAPIST

## 2020-08-13 PROCEDURE — 97140 MANUAL THERAPY 1/> REGIONS: CPT | Performed by: OCCUPATIONAL THERAPIST

## 2020-08-13 PROCEDURE — 97110 THERAPEUTIC EXERCISES: CPT | Performed by: OCCUPATIONAL THERAPIST

## 2020-08-13 PROCEDURE — 97530 THERAPEUTIC ACTIVITIES: CPT | Performed by: OCCUPATIONAL THERAPIST

## 2020-08-13 NOTE — FLOWSHEET NOTE
1100 [facility], [facility]  [address],  [address], [address] [facility]  Phone: [phone] Fax: [phone]    Occupational Therapy Treatment Note/ Progress Report:     Date:  2020    Patient Name:  Marimar Noble    :  1955  MRN: [id number]    Medical/Treatment Diagnosis Information:  · Diagnosis: S52.552D (ICD-10-CM) - Other closed extra-articular fracture of distal end of left radius with routine healing   · Treatment Diagnosis: L wrist stiffness - C63.910     Insurance/Certification information:     Physician Information:  Referring Practitioner: Sukumar Le MD  Has the plan of care been signed (Y/N):        []  Yes  [x]  No       Visit # Insurance Allowable Auth Required   13  []  Yes []  No        Is this a Progress Report:     []  Yes  [x]  No      If Yes:  Date Range for reporting period:  Beginning  Ending    Progress report will be due (10 Rx or 30 days whichever is less): 39    Recertification will be due (POC Duration  / 90 days whichever is less): 20     Date of Injury: 2020  Date of Surgery: 20 s/p ORIF L distal radius, CTR     Date of Patient follow up with Physician: 20     RESTRICTIONS/PRECAUTIONS: surgical protocol      Latex Allergy:  [x]? No      []? Yes                    Pacemaker:  [x]? No       []? Yes      Preferred Language for Healthcare:   [x]? English       []? other:      Functional Scale: 13% (Quick DASH)                                 Date assessed:  8/3/2020     SUBJECTIVE: compliant with HEP; less swelling, more mobility noted    Patient reported deficits/history of current problem: fell while at [place] home in April; wore a brace for several weeks until returned to [place] in late May     Pain Scale: 0/10        OBJECTIVE:       Date: 20   Objective Measures/Tests: S/p 6.5 weeks S/p 7 weeks      ROM:Digits: tips to DP[facility]    1.5cm IF/LF  2cm RF  1cm SF 1cm IF/LF/SF  1.5cm RF      Passively 0cm to [address] IF-SF     Thumb tip to [facility]    1cm 0.75cm   Wrist ext/flex            rd/ud    49/45 50/50   FA sup/pron     80/80   Strength:  L 25 L 24         Observations:  Guarded posturing of L wrist/hand observed during functional performance    Mild increased edema noted digits (distal to compression sleeve) Persistent guarded position of L wrist/hand observed    Other:                  MODALITIES:      Fluidotherapy (94772) 25' 15' 15'   Estim (19047/47877)      Paraffin (31387)      US (44306)      Iontophoresis (76710)      Hot Pack      Cold Pack            INTERVENTIONS:      Therapeutic Exercise (01684)      AROM 10x each wrist ext/flex, FA rotation    10x2 flat fist    10x wrist circles 10x each wrist ext/flex, FA rotation    10x full fist, flat fist 10x each wrist ext/flex, FA rotation    10x full fist, flat fist   PROM 10x intrinsic stretching, composite flexion of digits    10x wrist ext, flex into power web (composite stretching)   10x intrinsic stretching, composite flexion of digits 10x intrinsic stretching, composite flexion of digits    Power Web - 10x composite stretch wrist ext, flex with elbow ext   Strengthening Yellow tband, 10x2 wrist ext, flex  2#, B wrist ext, flex x 10 each         Therapeutic Activity (94762)      Foam Blocks Use of light tea strainer (wrapped with rband for increased resistance) to  FB x 50     Thumb Oppociser 10x2  10x   FA Bar 8oz, mid bar x 10  1# at end of bar, 15x   Sponge exercises      Wrist Wrap      Bimanual Activities  Use of rolling pin to flatten yellow putty x ~20    Use of small cones in B hands to \"cut cookies\" x 30 - cueing for avoidance of substitution patterns L    Use of B tea strainers to /release foam blocks x 20 - cueing for L to avoid substitutions    Therabar   Red, B UE x 10 sup, pron   Thumb Exerciser 10x2  10x   Manual Therapy (71275) 10' STM, retrograde massage, gentle joint mobilizations, HEP activities related to strengthening, flexibility, endurance, ROM of scapular, scapulothoracic and UE control with self care, reaching, carrying, lifting, house/yardwork, driving/computer work  [x] (19737) Reviewed/Progressed HEP activities related to improving balance, coordination, kinesthetic sense, posture, motor skill, proprioception of scapular, scapulothoracic and UE control with self care, reaching, carrying, lifting, house/yardwork, driving/computer work      Manual Treatments:  PROM / STM / Oscillations-Mobs:  G-I, II, III, IV (PA's, Inf., Post.)  [x] (27847) Provided manual therapy to mobilize soft tissue/joints of cervical/CT, scapular GHJ and UE for the purpose of modulating pain, promoting relaxation,  increasing ROM, reducing/eliminating soft tissue swelling/inflammation/restriction, improving soft tissue extensibility and allowing for proper ROM for normal function with self care, reaching, carrying, lifting, house/yardwork, driving/computer work    ADL Training:  [x] (10437) Provided self-care/home management training related to activities of daily living and compensatory training, and/or use of adaptive equipment      Charges:  Timed Code Treatment Minutes: 38   Total Treatment Minutes: 53   Worker's Comp: Time In/Time Out     [] EVAL (LOW) 86161 (typically 20 minutes face-to-face)    [] EVAL (MOD) 45572 (typically 30 minutes face-to-face)  [] EVAL (HIGH) 72927 (typically 45 minutes face-to-face)  [] OT Re-eval (09198)       [x] Gerson (66921) x 1    [] YTMCV(02815)  [] NMR (79375) x      [] Estim (attended) (90252)   [x] Manual ([id number]) x 1     [] US (94823)  [x] TA (08381) x 1   [] Paraffin (32588)  [] ADL  (09689) x     [] Splint/L code:  [] Estim (unattended) (22 052103)  [x] Fluidotherapy (50546)  [] Other:      ASSESSMENT: ROM progressing, strength limitations persist    GOALS:  Patient stated goal: getting hand movement back    [x] Progressing: [] Met: [] Not Met: [] Adjusted    Therapist goals for Patient:   Short Term Goals: To be achieved in: 2 weeks  1. Independent in HEP and progression per patient tolerance, in order to prevent re-injury. [x] Progressing: [] Met: [] Not Met: [] Adjusted   2. Patient will have a decrease in pain to facilitate improvement in movement, function, and ADLs as indicated by Functional Deficits. [x] Progressing: [] Met: [] Not Met: [] Adjusted    Long Term Goals to be achieved in 6-8 weeks (through 8/27/20), including patient directed goals to address patient identified performance deficits:  1) Pt to be independent in graded HEP progression with a good level of effort and compliance. [x] Progressing: [] Met: [] Not Met: [] Adjusted   2) Pt to report a score of </= 57% on the Quick DASH disability questionnaire for increased performance with carrying, moving, and handling objects. [] Progressing: [x] XNL:1/5/8202  [] Not Met: [] Adjusted   3) Pt will demonstrate increased ROM to L wrist ext/flex >/= 40 deg each for improved independence with dressing and bathing tasks. [] Progressing: [x] VBD:0/1/2109  [] Not Met: [] Adjusted   4) Pt will demonstrate increased strength to R /pinch >/= 50% of  for improved independence with opening jars/lids. [x] Progressing: [] Met: [] Not Met: [] Adjusted   5) Pt will have a decrease in pain to 1-2/10 to facilitate return to cooking/cleaning, household duties. [] Progressing: [x] TIP:3/69/8520  [] Not Met: [] Adjusted      Overall Progression Towards Functional Goals/Treatment Progress Update:  [] Patient is progressing as expected towards functional goals listed. [x] Progression is slowed due to complexities/impairments listed - increased duration from injury (April) to surgery (June)  [] Progression has been slowed due to co-morbidities.   [] Plan just implemented, too soon to assess goals progression <30 days  [] Goals require adjustment due to lack of progress  [] Patient is not progressing as expected and requires

## 2020-08-18 ENCOUNTER — HOSPITAL ENCOUNTER (OUTPATIENT)
Dept: OCCUPATIONAL THERAPY | Age: 65
Setting detail: THERAPIES SERIES
Discharge: HOME | End: 2020-08-18
Payer: MEDICARE

## 2020-08-18 PROCEDURE — 97110 THERAPEUTIC EXERCISES: CPT | Performed by: OCCUPATIONAL THERAPIST

## 2020-08-18 PROCEDURE — 97140 MANUAL THERAPY 1/> REGIONS: CPT | Performed by: OCCUPATIONAL THERAPIST

## 2020-08-18 PROCEDURE — 97022 WHIRLPOOL THERAPY: CPT | Performed by: OCCUPATIONAL THERAPIST

## 2020-08-18 NOTE — FLOWSHEET NOTE
[facility]  [address],  [address], [address] [employer]  Phone: [phone] Fax: [phone]    Occupational Therapy Treatment Note/ Progress Report:     Date:  2020    Patient Name:  Tiburcio Vazquez    :  1955  MRN: [id number]    Medical/Treatment Diagnosis Information:  · Diagnosis: S52.552D (ICD-10-CM) - Other closed extra-articular fracture of distal end of left radius with routine healing   · Treatment Diagnosis: L wrist stiffness - B54.346     Insurance/Certification information:     Physician Information:  Referring Practitioner: Caleb Mojica MD  Has the plan of care been signed (Y/N):        []  Yes  [x]  No       Visit # Insurance Allowable Auth Required   14  []  Yes []  No        Is this a Progress Report:     []  Yes  [x]  No      If Yes:  Date Range for reporting period:  Beginning  Ending    Progress report will be due (10 Rx or 30 days whichever is less): 63    Recertification will be due (POC Duration  / 90 days whichever is less): 20     Date of Injury: 2020  Date of Surgery: 20 s/p ORIF L distal radius, CTR     Date of Patient follow up with Physician: 20     RESTRICTIONS/PRECAUTIONS: surgical protocol      Latex Allergy:  [x]? No      []? Yes                    Pacemaker:  [x]? No       []? Yes      Preferred Language for Healthcare:   [x]? English       []? other:      Functional Scale: 13% (Quick DASH)                                 Date assessed:  8/3/2020     SUBJECTIVE: doing well.  Feeling more flexible     Patient reported deficits/history of current problem: fell while at [place] home in April; wore a brace for several weeks until returned to [employer] in late May     Pain Scale: 0/10        OBJECTIVE:       Date: 20    Objective Measures/Tests: S/p 6.5 weeks S/p 7 weeks       ROM:Digits: tips to DPFC    1.5cm IF/LF  2cm RF  1cm SF 1cm IF/LF/SF  1.5cm RF      Passively 0cm to [address] IF-SF      Thumb tip to DPFC    1cm 0.75cm    Wrist ext/flex            rd/ud    49/45 50/50    FA sup/pron     80/80    Strength:  L 25 L 24           Observations:  Guarded posturing of L wrist/hand observed during functional performance    Mild increased edema noted digits (distal to compression sleeve) Persistent guarded position of L wrist/hand observed     Other:                     MODALITIES:       Fluidotherapy (35809) 18' 15' 15' 15'   Estim (55596/11760)       Paraffin (10062)       US (27357)       Iontophoresis (00011)       Hot Pack       Cold Pack              INTERVENTIONS:       Therapeutic Exercise (62658)       AROM 10x each wrist ext/flex, FA rotation    10x2 flat fist    10x wrist circles 10x each wrist ext/flex, FA rotation    10x full fist, flat fist 10x each wrist ext/flex, FA rotation    10x full fist, flat fist X 10 each     PROM 10x intrinsic stretching, composite flexion of digits    10x wrist ext, flex into power web (composite stretching)   10x intrinsic stretching, composite flexion of digits 10x intrinsic stretching, composite flexion of digits    Power Web - 10x composite stretch wrist ext, flex with elbow ext Wrist flex/ext       Power web red x 10 wrist ext stretch    Strengthening Yellow tband, 10x2 wrist ext, flex  2#, B wrist ext, flex x 10 each           Therapeutic Activity (82758)       Foam Blocks Use of light tea strainer (wrapped with rband for increased resistance) to  FB x 50      Thumb Oppociser 10x2  10x    FA Bar 8oz, mid bar x 10  1# at end of bar, 15x    Sponge exercises    Green clip and tspn with foam blocks    Wrist Wrap       Bimanual Activities  Use of rolling pin to flatten yellow putty x ~20    Use of small cones in B hands to \"cut cookies\" x 30 - cueing for avoidance of substitution patterns L    Use of B tea strainers to /release foam blocks x 20 - cueing for L to avoid substitutions     Therabar   Red, B UE x 10 sup, pron Red  X 20 each    digiflex x 20    Thumb Exerciser 10x2  10x    Manual Therapy (44562) 10' STM, retrograde massage, gentle joint mobilizations, AA/PROM; issued compression glove for edema control at night/prn 4' STM, retrograde massage, gentle joint mobilizations, AA/PROM 8' STM, retrograde massage, gentle joint mobilizations, AA/PROM DTM, scar mass and jt mob 10'                 Neuromuscular Reeducation (54586) Frequent cueing for exercise technique, avoidance of substitutions cueing for exercise technique, avoidance of substitutions Cueing as prior                  ADL Training (97461) Reinforced incorporation of L into light daily tasks, avoidance of substitutions, guarded posturing Reinforced incorporation of L into light daily tasks, avoidance of substitutions, guarded posturing Reinforced as prior                  HEP Training/Review Added wrist ext/flex tband exercises, pinching (with sponge), and FA rotation with hammer                    Splinting       Lcode:       Orthotic Mgmt, Subsequent Enc (68427)       Orthotic Mgmt & Training (49141)              Other: Incision Care                                     Therapeutic Exercise & NMR:  [x] (24323) Provided verbal/tactile cueing for activities related to strengthening, flexibility, endurance, ROM  for improvements in scapular, scapulothoracic and UE control with self care, reaching, carrying, lifting, house/yardwork, driving/computer work. [x] (61607) Provided verbal/tactile cueing for activities related to improving balance, coordination, kinesthetic sense, posture, motor skill, proprioception  to assist with  scapular, scapulothoracic and UE control with self care, reaching, carrying, lifting, house/yardwork, driving/computer work.     Therapeutic Activities & NMR:    [] (35573 or 75145) Provided verbal/tactile cueing for activities related to improving balance, coordination, kinesthetic sense, posture, motor skill, proprioception and motor activation to allow for proper function of scapular, scapulothoracic and UE control with self care, carrying, lifting, driving/computer work    Home Exercise Program:    [x] (55423) Reviewed/Progressed HEP activities related to strengthening, flexibility, endurance, ROM of scapular, scapulothoracic and UE control with self care, reaching, carrying, lifting, house/yardwork, driving/computer work  [] (16436) Reviewed/Progressed HEP activities related to improving balance, coordination, kinesthetic sense, posture, motor skill, proprioception of scapular, scapulothoracic and UE control with self care, reaching, carrying, lifting, house/yardwork, driving/computer work      Manual Treatments:  PROM / STM / Oscillations-Mobs:  G-I, II, III, IV (PA's, Inf., Post.)  [x] (75981) Provided manual therapy to mobilize soft tissue/joints of cervical/CT, scapular GHJ and UE for the purpose of modulating pain, promoting relaxation,  increasing ROM, reducing/eliminating soft tissue swelling/inflammation/restriction, improving soft tissue extensibility and allowing for proper ROM for normal function with self care, reaching, carrying, lifting, house/yardwork, driving/computer work    ADL Training:  [] (18627) Provided self-care/home management training related to activities of daily living and compensatory training, and/or use of adaptive equipment      Charges:  Timed Code Treatment Minutes: 30   Total Treatment Minutes: 45   Worker's Comp: Time In/Time Out     [] EVAL (LOW) 59025 (typically 20 minutes face-to-face)    [] EVAL (MOD) 59416 (typically 30 minutes face-to-face)  [] EVAL (HIGH) [phone] (typically 45 minutes face-to-face)  [] OT Re-eval (09148)       [x] Gerson ([id number]) x 1    [] CEROJ(32387)  [] NMR (16567) x      [] Estim (attended) (25276)   [x] Manual ([id number]) x 1     [] US (94963)  [] TA (54324) x    [] Paraffin (59332)  [] ADL  ([phone]) x     [] Splint/L code:  [] Estim (unattended) (22 [phone])  [x] Fluidotherapy (24358)  [] Other: ASSESSMENT: ROM progressing, strength limitations persist    GOALS:  Patient stated goal: getting hand movement back    [x] Progressing: [] Met: [] Not Met: [] Adjusted    Therapist goals for Patient:   Short Term Goals: To be achieved in: 2 weeks  1. Independent in HEP and progression per patient tolerance, in order to prevent re-injury. [x] Progressing: [] Met: [] Not Met: [] Adjusted   2. Patient will have a decrease in pain to facilitate improvement in movement, function, and ADLs as indicated by Functional Deficits. [x] Progressing: [] Met: [] Not Met: [] Adjusted    Long Term Goals to be achieved in 6-8 weeks (through 8/27/20), including patient directed goals to address patient identified performance deficits:  1) Pt to be independent in graded HEP progression with a good level of effort and compliance. [x] Progressing: [] Met: [] Not Met: [] Adjusted   2) Pt to report a score of </= 57% on the Quick DASH disability questionnaire for increased performance with carrying, moving, and handling objects. [] Progressing: [x] MCT:0/2/6034  [] Not Met: [] Adjusted   3) Pt will demonstrate increased ROM to L wrist ext/flex >/= 40 deg each for improved independence with dressing and bathing tasks. [] Progressing: [x] OSWALDO:1/2/0802  [] Not Met: [] Adjusted   4) Pt will demonstrate increased strength to R /pinch >/= 50% of  for improved independence with opening jars/lids. [x] Progressing: [] Met: [] Not Met: [] Adjusted   5) Pt will have a decrease in pain to 1-2/10 to facilitate return to cooking/cleaning, household duties. [] Progressing: [x] JSC:3/33/6014  [] Not Met: [] Adjusted      Overall Progression Towards Functional Goals/Treatment Progress Update:  [] Patient is progressing as expected towards functional goals listed.     [x] Progression is slowed due to complexities/impairments listed - increased duration from injury (April) to surgery (June)  [] Progression has been slowed due to co-morbidities. [] Plan just implemented, too soon to assess goals progression <30 days  [] Goals require adjustment due to lack of progress  [] Patient is not progressing as expected and requires additional follow up with physician  [] All goals are met  [] Other:     Prognosis for POC: [x] Good [] Fair  [] Poor    Patient requires continued skilled intervention: [x] Yes  [] No    Treatment/Activity Tolerance:  [x] Patient able to complete treatment  [] Patient limited by fatigue  [] Patient limited by pain    [] Patient limited by other medical complications  [] Other:                  PLAN:    [x] Continue per plan of care    [] Alter current plan (see comments above)  [] Plan of care initiated [] Hold pending MD visit [] Discharge      Electronically signed by:  Sheila Berg OTR/DARIA [id number]      Note: If patient does not return for scheduled/ recommended follow up visits, this note will serve as a discharge from care along with most recent update on progress.

## 2020-08-20 ENCOUNTER — HOSPITAL ENCOUNTER (OUTPATIENT)
Dept: OCCUPATIONAL THERAPY | Age: 65
Setting detail: THERAPIES SERIES
Discharge: HOME | End: 2020-08-20
Payer: MEDICARE

## 2020-08-20 PROCEDURE — 97140 MANUAL THERAPY 1/> REGIONS: CPT | Performed by: OCCUPATIONAL THERAPIST

## 2020-08-20 PROCEDURE — 97022 WHIRLPOOL THERAPY: CPT | Performed by: OCCUPATIONAL THERAPIST

## 2020-08-20 PROCEDURE — 97110 THERAPEUTIC EXERCISES: CPT | Performed by: OCCUPATIONAL THERAPIST

## 2020-08-20 NOTE — FLOWSHEET NOTE
[facility]  [address], [address] [facility], [address] [facility]  Phone: [phone] Fax: [phone]    Occupational Therapy Treatment Note/ Progress Report:     Date:  2020    Patient Name:  Yumiko Hawley    :  1955  MRN: [id number]    Medical/Treatment Diagnosis Information:  · Diagnosis: S52.552D (ICD-10-CM) - Other closed extra-articular fracture of distal end of left radius with routine healing   · Treatment Diagnosis: L wrist stiffness - N24.536     Insurance/Certification information:     Physician Information:  Referring Practitioner: Nasrin Rea MD  Has the plan of care been signed (Y/N):        []  Yes  [x]  No       Visit # Insurance Allowable Auth Required   15  []  Yes []  No        Is this a Progress Report:     []  Yes  [x]  No      If Yes:  Date Range for reporting period:  Beginning  Ending    Progress report will be due (10 Rx or 30 days whichever is less): 72    Recertification will be due (POC Duration  / 90 days whichever is less): 20     Date of Injury: 2020  Date of Surgery: 20 s/p ORIF L distal radius, CTR     Date of Patient follow up with Physician: 20     RESTRICTIONS/PRECAUTIONS: surgical protocol      Latex Allergy:  [x]? No      []? Yes                    Pacemaker:  [x]? No       []? Yes      Preferred Language for Healthcare:   [x]? English       []? other:      Functional Scale: 13% (Quick DASH)                                 Date assessed:  8/3/2020     SUBJECTIVE: easier to grasp items at home now.  Cooking more     Patient reported deficits/history of current problem: fell while at [place] home in April; wore a brace for several weeks until returned to [employer] in late May     Pain Scale: 0/10        OBJECTIVE:       Date: 20    Objective Measures/Tests: S/p 6.5 weeks S/p 7 weeks        ROM:Digits: tips to DPFC    1.5cm IF/LF  2cm RF  1cm SF 1cm IF/LF/SF  1.5cm RF      Passively 0cm to [address] [employer] IF-SF       Thumb tip to DPFC    1cm 0.75cm     Wrist ext/flex            rd/ud    49/45 50/50     FA sup/pron     80/80     Strength:  L 25 L 24  : R: 50#  L: 25#           Observations:  Guarded posturing of L wrist/hand observed during functional performance    Mild increased edema noted digits (distal to compression sleeve) Persistent guarded position of L wrist/hand observed      Other:                        MODALITIES:        Fluidotherapy (39300) 18' 15' 15' 15' 15'   Estim (94036/67949)        Paraffin (48175)        US (08434)        Iontophoresis (43961)        Hot Pack        Cold Pack                INTERVENTIONS:        Therapeutic Exercise (83019)        AROM 10x each wrist ext/flex, FA rotation    10x2 flat fist    10x wrist circles 10x each wrist ext/flex, FA rotation    10x full fist, flat fist 10x each wrist ext/flex, FA rotation    10x full fist, flat fist X 10 each   X 10 each   PROM 10x intrinsic stretching, composite flexion of digits    10x wrist ext, flex into power web (composite stretching)   10x intrinsic stretching, composite flexion of digits 10x intrinsic stretching, composite flexion of digits    Power Web - 10x composite stretch wrist ext, flex with elbow ext Wrist flex/ext       Power web red x 10 wrist ext stretch  Wrist flex ext     Power web red ext stretch x 10    Strengthening Yellow tband, 10x2 wrist ext, flex  2#, B wrist ext, flex x 10 each             Therapeutic Activity (19506)        Foam Blocks Use of light tea strainer (wrapped with rband for increased resistance) to  FB x 50       Thumb Oppociser 10x2  10x     FA Bar 8oz, mid bar x 10  1# at end of bar, 15x     Sponge exercises    Green clip and tspn with foam blocks  Grasp release foam blocks 5'   Wrist Wrap        Bimanual Activities  Use of rolling pin to flatten yellow putty x ~20    Use of small cones in B hands to \"cut cookies\" x 30 - cueing for avoidance of substitution patterns L    Use of B tea strainers to /release foam blocks x 20 - cueing for L to avoid substitutions      Therabar   Red, B UE x 10 sup, pron Red  X 20 each    digiflex x 20  Red x 20 each    X 20    Thumb Exerciser 10x2  10x     Manual Therapy (39893) 10' STM, retrograde massage, gentle joint mobilizations, AA/PROM; issued compression glove for edema control at night/prn 4' STM, retrograde massage, gentle joint mobilizations, AA/PROM 8' STM, retrograde massage, gentle joint mobilizations, AA/PROM DTM, scar mass and jt mob 10' DTM, scar mass 10'                   Neuromuscular Reeducation (85689) Frequent cueing for exercise technique, avoidance of substitutions cueing for exercise technique, avoidance of substitutions Cueing as prior                     ADL Training (60932) Reinforced incorporation of L into light daily tasks, avoidance of substitutions, guarded posturing Reinforced incorporation of L into light daily tasks, avoidance of substitutions, guarded posturing Reinforced as prior                     HEP Training/Review Added wrist ext/flex tband exercises, pinching (with sponge), and FA rotation with hammer                       Splinting        Lcode:        Orthotic Mgmt, Subsequent Enc (97181)        Orthotic Mgmt & Training (67694)                Other: Incision Care                                          Therapeutic Exercise & NMR:  [x] (99701) Provided verbal/tactile cueing for activities related to strengthening, flexibility, endurance, ROM  for improvements in scapular, scapulothoracic and UE control with self care, reaching, carrying, lifting, house/yardwork, driving/computer work.     [x] (23586) Provided verbal/tactile cueing for activities related to improving balance, coordination, kinesthetic sense, posture, motor skill, proprioception  to assist with  scapular, scapulothoracic and UE control with self care, reaching, carrying, lifting, house/yardwork, driving/computer work.     Therapeutic Activities & NMR:    [] (64934 or 56087) Provided verbal/tactile cueing for activities related to improving balance, coordination, kinesthetic sense, posture, motor skill, proprioception and motor activation to allow for proper function of scapular, scapulothoracic and UE control with self care, carrying, lifting, driving/computer work    Home Exercise Program:    [x] (04733) Reviewed/Progressed HEP activities related to strengthening, flexibility, endurance, ROM of scapular, scapulothoracic and UE control with self care, reaching, carrying, lifting, house/yardwork, driving/computer work  [] (10140) Reviewed/Progressed HEP activities related to improving balance, coordination, kinesthetic sense, posture, motor skill, proprioception of scapular, scapulothoracic and UE control with self care, reaching, carrying, lifting, house/yardwork, driving/computer work      Manual Treatments:  PROM / STM / Oscillations-Mobs:  G-I, II, III, IV (PA's, Inf., Post.)  [x] (90983) Provided manual therapy to mobilize soft tissue/joints of cervical/CT, scapular GHJ and UE for the purpose of modulating pain, promoting relaxation,  increasing ROM, reducing/eliminating soft tissue swelling/inflammation/restriction, improving soft tissue extensibility and allowing for proper ROM for normal function with self care, reaching, carrying, lifting, house/yardwork, driving/computer work    ADL Training:  [] (81736) Provided self-care/home management training related to activities of daily living and compensatory training, and/or use of adaptive equipment      Charges:  Timed Code Treatment Minutes: 30   Total Treatment Minutes: 45   Worker's Comp: Time In/Time Out     [] EVAL (LOW) 56559 (typically 20 minutes face-to-face)    [] EVAL (MOD) 66612 (typically 30 minutes face-to-face)  [] EVAL (HIGH) 54013 (typically 45 minutes face-to-face)  [] OT Re-eval (45477)       [x] Gerson [phone]) x 1    [] IULDK(43644)  []

## 2020-08-26 ENCOUNTER — HOSPITAL ENCOUNTER (OUTPATIENT)
Dept: OCCUPATIONAL THERAPY | Age: 65
Setting detail: THERAPIES SERIES
Discharge: HOME | End: 2020-08-26
Payer: MEDICARE

## 2020-08-26 PROCEDURE — 97022 WHIRLPOOL THERAPY: CPT | Performed by: OCCUPATIONAL THERAPIST

## 2020-08-26 PROCEDURE — 97530 THERAPEUTIC ACTIVITIES: CPT | Performed by: OCCUPATIONAL THERAPIST

## 2020-08-26 PROCEDURE — 97140 MANUAL THERAPY 1/> REGIONS: CPT | Performed by: OCCUPATIONAL THERAPIST

## 2020-08-26 PROCEDURE — 97110 THERAPEUTIC EXERCISES: CPT | Performed by: OCCUPATIONAL THERAPIST

## 2020-08-26 NOTE — FLOWSHEET NOTE
[address] [facility] [place]  [address], [address], [address] [place] [address]  Phone: [phone] Fax: [phone]    Occupational Therapy Treatment Note/ Progress Report:     Date:  2020    Patient Name:  Nyla Hansen    :  1955  MRN: [id number]    Medical/Treatment Diagnosis Information:  · Diagnosis: S52.552D (ICD-10-CM) - Other closed extra-articular fracture of distal end of left radius with routine healing   · Treatment Diagnosis: L wrist stiffness - Z84.680     Insurance/Certification information:     Physician Information:  Referring Practitioner: Rosi Andersen MD  Has the plan of care been signed (Y/N):        []  Yes  [x]  No       Visit # Insurance Allowable Auth Required   16  []  Yes []  No        Is this a Progress Report:     []  Yes  [x]  No      If Yes:  Date Range for reporting period:  Beginning  Ending    Progress report will be due (10 Rx or 30 days whichever is less): 90    Recertification will be due (POC Duration  / 90 days whichever is less): 20     Date of Injury: 2020  Date of Surgery: 20 s/p ORIF L distal radius, CTR     Date of Patient follow up with Physician: 20     RESTRICTIONS/PRECAUTIONS: surgical protocol      Latex Allergy:  [x]? No      []? Yes                    Pacemaker:  [x]? No       []? Yes      Preferred Language for Healthcare:   [x]? English       []? other:      Functional Scale: 13% (Quick DASH)                                 Date assessed:  8/3/2020     SUBJECTIVE: trying to be more aware of using L hand/wrist for activities    Patient reported deficits/history of current problem: fell while at [place] home in April; wore a brace for several weeks until returned to [place] in late May     Pain Scale: 0/10        OBJECTIVE:       Date: 20   Objective Measures/Tests:  S/p 9 weeks, 1 day     ROM:Digits: tips to DPFC    1cm IF/LF/SF  1.5cm RF   Thumb tip to DPFC    0.5cm Wrist ext/flex            rd/ud    55/60  20/30   FA sup/pron    82/88   Strength: II  Lateral Pinch  3 Point Pinch  Tip Pinch   : R: 50#  L: 25# R  59    L 29  L 8  6  4.5        Observations:   Observed pt using L hand/arm for adjusting glasses, carrying purse   Other:               MODALITIES:     Fluidotherapy (97297) 15' 20'   Estim (59015/00412)     Paraffin (96497)     US (40548)     Iontophoresis (60766)     Hot Pack     Cold Pack          INTERVENTIONS:     Therapeutic Exercise (31579)     AROM X 10 each 10x each wrist ext, flex, FA rotation   PROM Wrist flex ext     Power web red ext stretch x 10  10x each wrist, FA    Power web wrist ext/flex stretch   Strengthening          Therapeutic Activity (66254)     Putty  Use of small cone to \"cut cookies\" into yellow putty - x 30   Thumb Oppociser     FA Bar     Sponge exercises Grasp release foam blocks 5'    Wrist Wrap     Bimanual Activities  Use of rolling pin to flatten putty (yellow) - ~15 passes    Separation of yellow putty into 10 equal parts   Therabar Red x 20 each    X 20     Thumb Exerciser     Manual Therapy (15569) DTM, scar mass 10' 8' stm, retrograde massage, joint mobilizations, AA/PROM             Neuromuscular Reeducation (14044)  Cueing for exercise technique, avoidance of substitutions             ADL Training (26862)  Reinforced use of L arm for tasks, bimanual activities             HEP Training/Review               Splinting     Lcode:     Orthotic Mgmt, Subsequent Enc (82199)     Orthotic Mgmt & Training (90923)          Other: Incision Care                           Therapeutic Exercise & NMR:  [x] (42206) Provided verbal/tactile cueing for activities related to strengthening, flexibility, endurance, ROM  for improvements in scapular, scapulothoracic and UE control with self care, reaching, carrying, lifting, house/yardwork, driving/computer work.     [x] (60231) Provided verbal/tactile cueing for activities related to improving balance, coordination, kinesthetic sense, posture, motor skill, proprioception  to assist with  scapular, scapulothoracic and UE control with self care, reaching, carrying, lifting, house/yardwork, driving/computer work.     Therapeutic Activities & NMR:    [x] (98181 or 98362) Provided verbal/tactile cueing for activities related to improving balance, coordination, kinesthetic sense, posture, motor skill, proprioception and motor activation to allow for proper function of scapular, scapulothoracic and UE control with self care, carrying, lifting, driving/computer work    Home Exercise Program:    [x] (13327) Reviewed/Progressed HEP activities related to strengthening, flexibility, endurance, ROM of scapular, scapulothoracic and UE control with self care, reaching, carrying, lifting, house/yardwork, driving/computer work  [x] (60333) Reviewed/Progressed HEP activities related to improving balance, coordination, kinesthetic sense, posture, motor skill, proprioception of scapular, scapulothoracic and UE control with self care, reaching, carrying, lifting, house/yardwork, driving/computer work      Manual Treatments:  PROM / STM / Oscillations-Mobs:  G-I, II, III, IV (PA's, Inf., Post.)  [x] (89378) Provided manual therapy to mobilize soft tissue/joints of cervical/CT, scapular GHJ and UE for the purpose of modulating pain, promoting relaxation,  increasing ROM, reducing/eliminating soft tissue swelling/inflammation/restriction, improving soft tissue extensibility and allowing for proper ROM for normal function with self care, reaching, carrying, lifting, house/yardwork, driving/computer work    ADL Training:  [x] (88498) Provided self-care/home management training related to activities of daily living and compensatory training, and/or use of adaptive equipment      Charges:  Timed Code Treatment Minutes: 38   Total Treatment Minutes: 58   Worker's Comp: Time In/Time Out     [] INESSA (LOW) 22 [id number] (typically 20 minutes face-to-face)    [] EVAL (MOD) 27936 (typically 30 minutes face-to-face)  [] EVAL (HIGH) 50623 (typically 45 minutes face-to-face)  [] OT Re-eval (39755)       [x] Gerson (17904) x 1    [] RGBNR(66293)  [] NMR (59131) x      [] Estim (attended) (38510)   [x] Manual ([id number]) x  1    [] US (49454)  [x] TA (92240) x 1   [] Paraffin (98120)  [] ADL  (21001) x     [] Splint/L code:  [] Estim (unattended) (22 235653)  [x] Fluidotherapy (84720)  [] Other:      ASSESSMENT: ROM continues to improve, funtional strength progressing slowly    GOALS:  Patient stated goal: getting hand movement back    [] Progressing: [x] Met: [] Not Met: [] Adjusted    Therapist goals for Patient:   Short Term Goals: To be achieved in: 2 weeks  1. Independent in HEP and progression per patient tolerance, in order to prevent re-injury. [] Progressing: [x] Met: [] Not Met: [] Adjusted   2. Patient will have a decrease in pain to facilitate improvement in movement, function, and ADLs as indicated by Functional Deficits. [] Progressing: [x] Met: [] Not Met: [] Adjusted    Long Term Goals to be achieved in 6-8 weeks (through 20), including patient directed goals to address patient identified performance deficits:  1) Pt to be independent in graded HEP progression with a good level of effort and compliance. [x] Progressing: [] Met: [] Not Met: [] Adjusted   2) Pt to report a score of </=  25%on the Quick DASH disability questionnaire for increased performance with carrying, moving, and handling objects. [] Progressing: [x] IEJ:7619  [] Not Met: [] Adjusted   3) Pt will demonstrate increased ROM to L wrist ext/flex >/= 40 deg each for improved independence with dressing and bathing tasks. [] Progressing: [x] [id number]  [] Not Met: [] Adjusted   4) Pt will demonstrate increased strength to R /pinch >/= 50% of  for improved independence with opening jars/lids.   [x] Progressing: [] Met: [] Not Met: [] Adjusted   5) Pt will have a decrease in pain to 1-2/10 to facilitate return to cooking/cleaning, household duties. [] Progressing: [x] FHE:8/78/4344  [] Not Met: [] Adjusted      Overall Progression Towards Functional Goals/Treatment Progress Update:  [] Patient is progressing as expected towards functional goals listed. [x] Progression is slowed due to complexities/impairments listed - increased duration from injury (April) to surgery (June)  [] Progression has been slowed due to co-morbidities. [] Plan just implemented, too soon to assess goals progression <30 days  [] Goals require adjustment due to lack of progress  [] Patient is not progressing as expected and requires additional follow up with physician  [] All goals are met  [] Other:     Prognosis for POC: [x] Good [] Fair  [] Poor    Patient requires continued skilled intervention: [x] Yes  [] No    Treatment/Activity Tolerance:  [x] Patient able to complete treatment  [] Patient limited by fatigue  [] Patient limited by pain    [] Patient limited by other medical complications  [] Other:                  PLAN:    [x] Continue per plan of care    [] Alter current plan (see comments above)  [] Plan of care initiated [] Hold pending MD visit [] Discharge      Electronically signed by: Jaki Bell OTMIAH/L, PT, MPT, CHT, [id number], [id number]        Note: If patient does not return for scheduled/ recommended follow up visits, this note will serve as a discharge from care along with most recent update on progress.

## 2020-09-01 ENCOUNTER — HOSPITAL ENCOUNTER (OUTPATIENT)
Dept: OCCUPATIONAL THERAPY | Age: 65
Setting detail: THERAPIES SERIES
Discharge: HOME | End: 2020-09-01
Payer: MEDICARE

## 2020-09-01 PROCEDURE — 97022 WHIRLPOOL THERAPY: CPT | Performed by: OCCUPATIONAL THERAPIST

## 2020-09-01 PROCEDURE — 97110 THERAPEUTIC EXERCISES: CPT | Performed by: OCCUPATIONAL THERAPIST

## 2020-09-01 PROCEDURE — 97140 MANUAL THERAPY 1/> REGIONS: CPT | Performed by: OCCUPATIONAL THERAPIST

## 2020-09-01 PROCEDURE — 97530 THERAPEUTIC ACTIVITIES: CPT | Performed by: OCCUPATIONAL THERAPIST

## 2020-09-01 NOTE — FLOWSHEET NOTE
[address]  [address],  [address], [address]  Phone: [phone] Fax: [phone]        Occupational Therapy Re-Certification Plan of Konstantin Parks      Dear  Dr Luzma Crandall,    We had the pleasure of treating the following patient for occupational therapy services at [address] [place] [address]. A summary of our findings can be found in the updated assessment below. This includes our plan of care. If you have any questions or concerns regarding these findings, please do not hesitate to contact me at the office phone number checked above.   Thank you for the referral.     Physician Signature:________________________________Date:__________________  By signing above (or electronic signature), therapists plan is approved by physician    Date Range Of Visits: 20-20  Total Visits to Date: 16  Overall Response to Treatment:   [x]Patient is responding well to treatment and improvement is noted with regards to goals   []Patient should continue to improve in reasonable time if they continue HEP   []Patient has plateaued and is no longer responding to skilled OT intervention    []Patient is getting worse and would benefit from return to referring MD   []Patient unable to adhere to initial POC   []Other:         Occupational Therapy Treatment Note/ Progress Report:     Date:  2020    Patient Name:  Rodriguez Calderón    :  1955  MRN: [id number]    Medical/Treatment Diagnosis Information:  · Diagnosis: S52.552D (ICD-10-CM) - Other closed extra-articular fracture of distal end of left radius with routine healing   · Treatment Diagnosis: L wrist stiffness - C41.328     Insurance/Certification information:     Physician Information:  Referring Practitioner: David Albarran MD  Has the plan of care been signed (Y/N):        []  Yes  [x]  No       Visit # Insurance Allowable Auth Required   17  []  Yes []  No        Is this a Progress Report:     [x]  Yes  []  No      If Yes:  Date Range for reporting period:  Beginning 7/2/20  Ending 9/1/20    Progress report will be due (10 Rx or 30 days whichever is less): 9/05/67    Recertification will be due (POC Duration  / 90 days whichever is less): 9/14/20     Date of Injury: April 2020  Date of Surgery: 6/23/20 s/p ORIF L distal radius, CTR     Date of Patient follow up with Physician: 9/14/20     RESTRICTIONS/PRECAUTIONS: surgical protocol      Latex Allergy:  [x]? No      []? Yes                    Pacemaker:  [x]? No       []? Yes      Preferred Language for Healthcare:   [x]? English       []? other:      Functional Scale: 5% (Quick DASH)                                 Date assessed:  9/1/2020     SUBJECTIVE: trying to be more aware of using L hand/wrist for activities    Patient reported deficits/history of current problem: fell while at [facility] in April; wore a brace for several weeks until returned to [place] in late May     Pain Scale: 0/10        OBJECTIVE:       Date: 8/26/20 9/1/20   Objective Measures/Tests: S/p 9 weeks, 1 day   S/p 10 weeks   ROM:Digits: tips to DPFC   1cm IF/LF/SF  1.5cm RF Passive 0cm IF-SF  [place]ur[place]0md IF/LF/SF  1.5cm RF     Thumb tip to DPFC   0.5cm 0cm   Wrist ext/flex            rd/ud   55/60  20/30 55/60     FA sup/pron   82/88 84/90   Strength: II  Lateral Pinch  3 Point Pinch  Tip Pinch   R  59    L 29  L 8  6  4.5 R 45  L 30        Observations:  Observed pt using L hand/arm for adjusting glasses, carrying purse Intermittent shoulder substitutions noted for wrist related activities   Other:               MODALITIES:     Fluidotherapy (93157) 20' 20'   Estim (95899/66531)     Paraffin (45288)     US (53266)     Iontophoresis (47861)     Hot Pack     Cold Pack          INTERVENTIONS:     Therapeutic Exercise (14766)     AROM 10x each wrist ext, flex, FA rotation 10x each wrist, fa    10x2 digital ext/flex     PROM 10x each wrist, FA    Power web wrist ext/flex stretch 5x composite digital flex    10x each wrist, fa    PW stretch x 10 each wrist ext, flex   Strengthening  2#, 10x each wrist ext, flex - cueing for end range of motion        Therapeutic Activity (96365)     Putty Use of small cone to \"cut cookies\" into yellow putty - x 30    Thumb Oppociser     FA Bar  1#, end of bar, 10x2   Sponge exercises     Wrist Wrap     Bimanual Activities Use of rolling pin to flatten putty (yellow) - ~15 passes    Separation of yellow putty into 10 equal parts    Wrist Exerciser  20x each horizontal, vertical, mod resistance   Therabar  Red, green - 10x B sup/pron    Thumb Exerciser     Manual Therapy (32322) 8' stm, retrograde massage, joint mobilizations, AA/PROM 8' stm, retrograde massage, joint mobilizations, AA/PROM             Neuromuscular Reeducation (35683) Cueing for exercise technique, avoidance of substitutions Cueing for exercise technique, avoidance of substitutions, wrist/hand mechanics             ADL Training (13161) Reinforced use of L arm for tasks, bimanual activities As prior             HEP Training/Review               Splinting     Lcode:     Orthotic Mgmt, Subsequent Enc (21288)     Orthotic Mgmt & Training (26915)          Other: Incision Care                           Therapeutic Exercise & NMR:  [x] (48995) Provided verbal/tactile cueing for activities related to strengthening, flexibility, endurance, ROM  for improvements in scapular, scapulothoracic and UE control with self care, reaching, carrying, lifting, house/yardwork, driving/computer work. [x] (32095) Provided verbal/tactile cueing for activities related to improving balance, coordination, kinesthetic sense, posture, motor skill, proprioception  to assist with  scapular, scapulothoracic and UE control with self care, reaching, carrying, lifting, house/yardwork, driving/computer work.     Therapeutic Activities & NMR:    [x] (54555 or 96875) Provided verbal/tactile cueing for activities related to improving balance, coordination, kinesthetic sense, posture, motor skill, proprioception and motor activation to allow for proper function of scapular, scapulothoracic and UE control with self care, carrying, lifting, driving/computer work    Home Exercise Program:    [x] (68822) Reviewed/Progressed HEP activities related to strengthening, flexibility, endurance, ROM of scapular, scapulothoracic and UE control with self care, reaching, carrying, lifting, house/yardwork, driving/computer work  [x] (62663) Reviewed/Progressed HEP activities related to improving balance, coordination, kinesthetic sense, posture, motor skill, proprioception of scapular, scapulothoracic and UE control with self care, reaching, carrying, lifting, house/yardwork, driving/computer work      Manual Treatments:  PROM / STM / Oscillations-Mobs:  G-I, II, III, IV (PA's, Inf., Post.)  [x] (70367) Provided manual therapy to mobilize soft tissue/joints of cervical/CT, scapular GHJ and UE for the purpose of modulating pain, promoting relaxation,  increasing ROM, reducing/eliminating soft tissue swelling/inflammation/restriction, improving soft tissue extensibility and allowing for proper ROM for normal function with self care, reaching, carrying, lifting, house/yardwork, driving/computer work    ADL Training:  [x] (32457) Provided self-care/home management training related to activities of daily living and compensatory training, and/or use of adaptive equipment      Charges:  Timed Code Treatment Minutes: 40   Total Treatment Minutes: 60   Worker's Comp: Time In/Time Out     [] EVAL (LOW) 87539 (typically 20 minutes face-to-face)    [] EVAL (MOD) 14147 (typically 30 minutes face-to-face)  [] EVAL (HIGH) [phone] (typically 45 minutes face-to-face)  [] OT Re-eval (56609)       [x] Gerson [phone]) x 1    [] HRKZQ(48849)  [] NMR (87440) x      [] Estim (attended) (23159)   [x] Manual ([id number]) x  1    [] US (87860)  [x] TA () x 1   [] Paraffin (51515)  [] ADL  (90593) x     [] Splint/L code:  [] Estim (unattended) (34007)  [x] Fluidotherapy (96654)  [] Other:      ASSESSMENT: function, ROM and strength continue to progress; continue to reinforce avoidance of shoulder substitutions for wrist tasks/activities to promote carryover and maximize functional motion recovery    GOALS:  Patient stated goal: getting hand movement back    [] Progressing: [x] Met: [] Not Met: [] Adjusted    Therapist goals for Patient:   Short Term Goals: To be achieved in: 2 weeks  1. Independent in HEP and progression per patient tolerance, in order to prevent re-injury. [] Progressing: [x] Met: [] Not Met: [] Adjusted   2. Patient will have a decrease in pain to facilitate improvement in movement, function, and ADLs as indicated by Functional Deficits. [] Progressing: [x] Met: [] Not Met: [] Adjusted    Long Term Goals to be achieved in 2 additional weeks (through 9/14/20), including patient directed goals to address patient identified performance deficits:  1) Pt to be independent in graded HEP progression with a good level of effort and compliance. [x] Progressing: [] Met: [] Not Met: [] Adjusted   2) Pt to report a score of </=  25%on the Quick DASH disability questionnaire for increased performance with carrying, moving, and handling objects. [] Progressing: [x] ATV:3/7/8493  [] Not Met: [] Adjusted   3) Pt will demonstrate increased ROM to L wrist ext/flex >/= 40 deg each for improved independence with dressing and bathing tasks. [] Progressing: [x] WBB:3/1/9051  [] Not Met: [] Adjusted   4) Pt will demonstrate increased strength to R /pinch >/= 50% of  for improved independence with opening jars/lids. [x] Progressing: [] Met: [] Not Met: [] Adjusted   5) Pt will have a decrease in pain to 1-2/10 to facilitate return to cooking/cleaning, household duties.   [] Progressing: [x] ZBE:5/08/9133  [] Not Met: [] Adjusted      Overall Progression Towards Functional Goals/Treatment Progress Update:  [] Patient is progressing as expected towards functional goals listed. [x] Progression is slowed due to complexities/impairments listed - increased duration from injury (April) to surgery (June)  [] Progression has been slowed due to co-morbidities. [] Plan just implemented, too soon to assess goals progression <30 days  [] Goals require adjustment due to lack of progress  [] Patient is not progressing as expected and requires additional follow up with physician  [] All goals are met  [] Other:     Prognosis for POC: [x] Good [] Fair  [] Poor    Patient requires continued skilled intervention: [x] Yes  [] No    Treatment/Activity Tolerance:  [x] Patient able to complete treatment  [] Patient limited by fatigue  [] Patient limited by pain    [] Patient limited by other medical complications  [] Other:                  PLAN:    [x] Continue per plan of care    [] Alter current plan (see comments above)  [] Plan of care initiated [] Hold pending MD visit [] Discharge      Electronically signed by: Jaki Bell OTR/L, PT, MPT, CHT, [id number], [id number]        Note: If patient does not return for scheduled/ recommended follow up visits, this note will serve as a discharge from care along with most recent update on progress.

## 2020-09-09 ENCOUNTER — HOSPITAL ENCOUNTER (OUTPATIENT)
Dept: OCCUPATIONAL THERAPY | Age: 65
Setting detail: THERAPIES SERIES
Discharge: HOME | End: 2020-09-09
Payer: MEDICARE

## 2020-09-09 PROCEDURE — 97140 MANUAL THERAPY 1/> REGIONS: CPT | Performed by: OCCUPATIONAL THERAPIST

## 2020-09-09 PROCEDURE — 97110 THERAPEUTIC EXERCISES: CPT | Performed by: OCCUPATIONAL THERAPIST

## 2020-09-09 PROCEDURE — 97022 WHIRLPOOL THERAPY: CPT | Performed by: OCCUPATIONAL THERAPIST

## 2020-09-09 PROCEDURE — 97530 THERAPEUTIC ACTIVITIES: CPT | Performed by: OCCUPATIONAL THERAPIST

## 2020-09-09 NOTE — FLOWSHEET NOTE
[facility]  [address], [address] [facility], [address] [facility]  Phone: [phone] Fax: [phone]        Occupational Therapy Treatment Note/ Progress Report:     Date:  2020    Patient Name:  Danyel Crenshaw    :  1955  MRN: [id number]    Medical/Treatment Diagnosis Information:  · Diagnosis: S52.552D (ICD-10-CM) - Other closed extra-articular fracture of distal end of left radius with routine healing   · Treatment Diagnosis: L wrist stiffness - Z97.196     Insurance/Certification information:     Physician Information:  Referring Practitioner: Corrinne Mulders, MD  Has the plan of care been signed (Y/N):        []  Yes  [x]  No       Visit # Insurance Allowable Auth Required   18  []  Yes []  No        Is this a Progress Report:     []  Yes  [x]  No      If Yes:  Date Range for reporting period:  Beginning 20  Ending    Progress report will be due (10 Rx or 30 days whichever is less):     Recertification will be due (POC Duration  / 90 days whichever is less): 20     Date of Injury: 2020  Date of Surgery: 20 s/p ORIF L distal radius, CTR     Date of Patient follow up with Physician: 20     RESTRICTIONS/PRECAUTIONS: surgical protocol      Latex Allergy:  [x]? No      []? Yes                    Pacemaker:  [x]? No       []? Yes      Preferred Language for Healthcare:   [x]? English       []? other:      Functional Scale: 5% (Quick DASH)                                 Date assessed:  2020     SUBJECTIVE: to see MD next week, doing better overall, favoring L arm less each day    Patient reported deficits/history of current problem: fell while at [place] home in April; wore a brace for several weeks until returned to [place] in late May     Pain Scale: 0/10        OBJECTIVE:       Date: 20   Objective Measures/Tests: S/p 9 weeks, 1 day   S/p 10 weeks    ROM:Digits: tips to DPFC   1cm IF/LF/SF  1.5cm RF Passive 0cm IF-SF  [id number] IF/LF/SF  1.5cm RF      Thumb tip to DPFC   0.5cm 0cm    Wrist ext/flex            rd/ud   55/60  20/30 55/60   58/60   FA sup/pron   82/88 84/90    Strength: II  Lateral Pinch  3 Point Pinch  Tip Pinch   R  59    L 29  L 8  6  4.5 R 45  L 30 R 52    L 32         Observations:  Observed pt using L hand/arm for adjusting glasses, carrying purse Intermittent shoulder substitutions noted for wrist related activities    Other:                  MODALITIES:      Fluidotherapy (94908) 20' 20' 17'   Estim (08734/68261)      Paraffin (14144)      US (25818)      Iontophoresis (15657)      Hot Pack      Cold Pack            INTERVENTIONS:      Therapeutic Exercise (67798)      AROM 10x each wrist ext, flex, FA rotation 10x each wrist, fa    10x2 digital ext/flex   10x each wrist, fa       PROM 10x each wrist, FA    Power web wrist ext/flex stretch 5x composite digital flex    10x each wrist, fa    PW stretch x 10 each wrist ext, flex 10x each wrist, fa    PW stretch x 10 each wrist ext, flex   Strengthening  2#, 10x each wrist ext, flex - cueing for end range of motion Red tband, B wrist ext, flex 10x2 each         Therapeutic Activity (37739)      Putty Use of small cone to \"cut cookies\" into yellow putty - x 30     Thumb Oppociser      FA Bar  1#, end of bar, 10x2 1.5#, mid bar, 5x2   Sponge exercises   Red/blue hand gripper to  foam blocks x 10x5   Wrist Wrap      Bimanual Activities Use of rolling pin to flatten putty (yellow) - ~15 passes    Separation of yellow putty into 10 equal parts     Wrist Exerciser  20x each horizontal, vertical, mod resistance 20x each horizontal, vertical, mod resistance   Therabar  Red, green - 10x B sup/pron     Thumb Exerciser      Manual Therapy (31392) 8' stm, retrograde massage, joint mobilizations, AA/PROM 8' stm, retrograde massage, joint mobilizations, AA/PROM 8' stm, retrograde massage, joint mobilizations, AA/PROM Neuromuscular Reeducation (61178) Cueing for exercise technique, avoidance of substitutions Cueing for exercise technique, avoidance of substitutions, wrist/hand mechanics Cueing for exercise technique, avoidance of substitutions, wrist/hand mechanics               ADL Training (72393) Reinforced use of L arm for tasks, bimanual activities As prior                HEP Training/Review                  Splinting      Lcode:      Orthotic Mgmt, Subsequent Enc (10554)      Orthotic Mgmt & Training (09662)            Other: Incision Care                                Therapeutic Exercise & NMR:  [x] (28895) Provided verbal/tactile cueing for activities related to strengthening, flexibility, endurance, ROM  for improvements in scapular, scapulothoracic and UE control with self care, reaching, carrying, lifting, house/yardwork, driving/computer work. [x] (29734) Provided verbal/tactile cueing for activities related to improving balance, coordination, kinesthetic sense, posture, motor skill, proprioception  to assist with  scapular, scapulothoracic and UE control with self care, reaching, carrying, lifting, house/yardwork, driving/computer work.     Therapeutic Activities & NMR:    [x] (65953 or 24001) Provided verbal/tactile cueing for activities related to improving balance, coordination, kinesthetic sense, posture, motor skill, proprioception and motor activation to allow for proper function of scapular, scapulothoracic and UE control with self care, carrying, lifting, driving/computer work    Home Exercise Program:    [x] (41170) Reviewed/Progressed HEP activities related to strengthening, flexibility, endurance, ROM of scapular, scapulothoracic and UE control with self care, reaching, carrying, lifting, house/yardwork, driving/computer work  [x] (47222) Reviewed/Progressed HEP activities related to improving balance, coordination, kinesthetic sense, posture, motor skill, proprioception of scapular, scapulothoracic Adjusted    Long Term Goals to be achieved in 2 additional weeks (through 9/14/20), including patient directed goals to address patient identified performance deficits:  1) Pt to be independent in graded HEP progression with a good level of effort and compliance. [x] Progressing: [] Met: [] Not Met: [] Adjusted   2) Pt to report a score of </=  25%on the Quick DASH disability questionnaire for increased performance with carrying, moving, and handling objects. [] Progressing: [x] TFM:0/6/5289  [] Not Met: [] Adjusted   3) Pt will demonstrate increased ROM to L wrist ext/flex >/= 40 deg each for improved independence with dressing and bathing tasks. [] Progressing: [x] FHB:6/5/5941  [] Not Met: [] Adjusted   4) Pt will demonstrate increased strength to R /pinch >/= 50% of  for improved independence with opening jars/lids. [x] Progressing: [] Met: [] Not Met: [] Adjusted   5) Pt will have a decrease in pain to 1-2/10 to facilitate return to cooking/cleaning, household duties. [] Progressing: [x] DXH:8/44/5226  [] Not Met: [] Adjusted      Overall Progression Towards Functional Goals/Treatment Progress Update:  [] Patient is progressing as expected towards functional goals listed. [x] Progression is slowed due to complexities/impairments listed - increased duration from injury (April) to surgery (June)  [] Progression has been slowed due to co-morbidities.   [] Plan just implemented, too soon to assess goals progression <30 days  [] Goals require adjustment due to lack of progress  [] Patient is not progressing as expected and requires additional follow up with physician  [] All goals are met  [] Other:     Prognosis for POC: [x] Good [] Fair  [] Poor    Patient requires continued skilled intervention: [x] Yes  [] No    Treatment/Activity Tolerance:  [x] Patient able to complete treatment  [] Patient limited by fatigue  [] Patient limited by pain    [] Patient limited by other medical complications  [] Other: PLAN:    [x] Continue per plan of care, progress note to MD at next visit, anticipate d/c to HEP at that time    [] Alter current plan (see comments above)  [] Plan of care initiated [] Hold pending MD visit [] Discharge      Electronically signed by: Jaki ALEX/L, PT, MPT, CHT, [id number], [id number]        Note: If patient does not return for scheduled/ recommended follow up visits, this note will serve as a discharge from care along with most recent update on progress.

## 2020-09-14 ENCOUNTER — HOSPITAL ENCOUNTER (OUTPATIENT)
Dept: OCCUPATIONAL THERAPY | Age: 65
Setting detail: THERAPIES SERIES
Discharge: HOME | End: 2020-09-14
Payer: MEDICARE

## 2020-09-14 ENCOUNTER — OFFICE VISIT (OUTPATIENT)
Dept: ORTHOPEDIC SURGERY | Age: 65
End: 2020-09-14
Payer: MEDICARE

## 2020-09-14 PROCEDURE — 97140 MANUAL THERAPY 1/> REGIONS: CPT | Performed by: OCCUPATIONAL THERAPIST

## 2020-09-14 PROCEDURE — 97022 WHIRLPOOL THERAPY: CPT | Performed by: OCCUPATIONAL THERAPIST

## 2020-09-14 PROCEDURE — 97110 THERAPEUTIC EXERCISES: CPT | Performed by: OCCUPATIONAL THERAPIST

## 2020-09-14 PROCEDURE — 97530 THERAPEUTIC ACTIVITIES: CPT | Performed by: OCCUPATIONAL THERAPIST

## 2020-09-14 PROCEDURE — 20550 NJX 1 TENDON SHEATH/LIGAMENT: CPT | Performed by: ORTHOPAEDIC SURGERY

## 2020-09-14 PROCEDURE — 99024 POSTOP FOLLOW-UP VISIT: CPT | Performed by: ORTHOPAEDIC SURGERY

## 2020-09-14 RX ORDER — BETAMETHASONE SODIUM PHOSPHATE AND BETAMETHASONE ACETATE 3; 3 MG/ML; MG/ML
6 INJECTION, SUSPENSION INTRA-ARTICULAR; INTRALESIONAL; INTRAMUSCULAR; SOFT TISSUE ONCE
Status: SHIPPED | OUTPATIENT
Start: 2020-09-14

## 2020-09-14 NOTE — DISCHARGE SUMMARY
pain      Comorbidities Affecting Functional Performance:             []?Anxiety (F41.9)/Depression (F32.9)             []?Diabetes Type 1(E10.65) or 2 (E11.65)       []? Rheumatoid Arthritis (M05.9)  []? Fibromyalgia (M79.7)  []? Neuropathy(G60.9)  []? Osteoarthritis(M19.91)  []? None             [x]? Other: osteoporosis    Plan:  Reason for Discharge:   [x] All goals achieved    [] Patient having surgery    [] Physician discontinued therapy    [] Insurance/Financial Limitations   [] Patient did not return for follow up visits   [] Home program/1 visit only   [] No subjective or objective improvement   [] Plateaued   [] Patient was unable to adhere to the plan of care established at evaluation. [] Referred back to physician for re-evaluation and did not return. [] Other:       Electronically signed by:   Karthikeyan Valdes OTR/L, PT, MPT, [address] [place] [address], [id number], [id number]

## 2020-09-14 NOTE — PROGRESS NOTES
Assessment: Excellent result from the basically he has 6-week out osteotomy of malunion distal radius fracture. She is now just short of 3 months and doing very well    Treatment Plan: She does have some palpable synovitis in her middle finger without isha triggering but there is some crepitation on full flexion some going to give her steroid injection to the middle finger A1 pulley today    Return in about 6 weeks (around 10/26/2020). History of Present Illness  Khang Patel is a 76 y.o. female. Location:  Left wrist Severity: mild pain Duration: 4/30/20  Modifying factors: 6/23/20 surgery, has been doing OT, helps a lot, left middle finger has been bothering her Associated symptoms: none    Review of Systems  Pertinent items are noted in HPI  Denies fever chills, confusion and bowel and bladder active change  Complete Review of Systems reviewed from patient history form dated 6/22/20 and available in the patients chart under the media tab. Vital Signs  There were no vitals filed for this visit. There is no height or weight on file to calculate BMI. Contributory History  None    Medical History  Past Medical History:   Diagnosis Date    Hypertension      Current Outpatient Medications on File Prior to Visit   Medication Sig Dispense Refill    lisinopril-hydroCHLOROthiazide (PRINZIDE;ZESTORETIC) 20-12.5 MG per tablet Take 1 tablet by mouth daily      Melatonin 10 MG TABS Take by mouth nightly      CALCIUM-MAGNESIUM PO Take by mouth daily      PSYLLIUM HUSK PO Take by mouth daily      Probiotic Product (PROBIOTIC PO) Take by mouth daily       Milk Thistle 1000 MG CAPS Take by mouth daily       Multiple Vitamins-Minerals (THERAPEUTIC MULTIVITAMIN-MINERALS) tablet Take 1 tablet by mouth daily.  Cholecalciferol (VITAMIN D PO) Take by mouth daily       Omega-3 Fatty Acids (FISH OIL PO) Take by mouth daily        No current facility-administered medications on file prior to visit. Allergies   Allergen Reactions    Penicillins     Sulfa Antibiotics      Social History     Socioeconomic History    Marital status: Single     Spouse name: Not on file    Number of children: Not on file    Years of education: Not on file    Highest education level: Not on file   Occupational History    Not on file   Social Needs    Financial resource strain: Not on file    Food insecurity     Worry: Not on file     Inability: Not on file    Transportation needs     Medical: Not on file     Non-medical: Not on file   Tobacco Use    Smoking status: Never Smoker    Smokeless tobacco: Never Used   Substance and Sexual Activity    Alcohol use: Yes     Comment: 10 drinks per week    Drug use: No    Sexual activity: Not on file   Lifestyle    Physical activity     Days per week: Not on file     Minutes per session: Not on file    Stress: Not on file   Relationships    Social connections     Talks on phone: Not on file     Gets together: Not on file     Attends [employer] service: Not on file     Active member of club or organization: Not on file     Attends meetings of clubs or organizations: Not on file     Relationship status: Not on file    Intimate partner violence     Fear of current or ex partner: Not on file     Emotionally abused: Not on file     Physically abused: Not on file     Forced sexual activity: Not on file   Other Topics Concern    Not on file   Social History Narrative    Not on file     Family History   Adopted: Yes       Physical Exam  Constitutional: Normal nutritional status  Mental Status: Alert and oriented  Skin: No rashes or erythema  Lymphatic: No lymphadenopathy    Hand Examination: Definite palpable tenderness over the A1 pulley of the middle finger with early crepitation.     Wrist range of motion listed in her last therapy note    Additional Comments:     Additional Examinations:  X-Ray Findings: PA lateral and oblique x-rays of the left wrist show a nearly normal r restoration of radial length and radial inclination she only has about 3 to 5 degrees of dorsal tilt. The distal end of the volar plate is just slightly prominent compared to the normal arc of the distal radius as we could not get it quite out completely after our osteotomy. I have discussed with her that if this starts to cause any irritation on her flexor tendons and were going to have to remove the plate  Additional Diagnostic Test Findings:    Office Procedures: The left middle trigger finger was injected today with 1-1/2 mL of Celestone and Xylocaine. The area over the A1 pulley was prepped with alcohol and under sterile technique a steroid injection was performed. Time Statement: This dictation was performed with a verbal recognition program. It is possible that there are still dictated errors within this office note. All efforts were made to ensure that this office note is accurate. Orders Placed This Encounter   Procedures    XR WRIST LEFT (MIN 3 VIEWS)     Standing Status:   Future     Number of Occurrences:   1     Standing Expiration Date:   9/14/2021    CO INJECT TENDON SHEATH/LIGAMENT       Attestation: I have reviewed the chief complaint and history of present illness (including ROS and PFSH) and vital documentation by my staff and I agree with their documentation and have added where applicable.

## 2020-09-14 NOTE — FLOWSHEET NOTE
[facility] [place]   [address],  [address], [address] [facility]  Phone: [phone] Fax: [phone]        Occupational Therapy Treatment Note/ Progress Report:     Date:  2020    Patient Name:  George Shaffer    :  1955  MRN: [id number]    Medical/Treatment Diagnosis Information:  · Diagnosis: S52.552D (ICD-10-CM) - Other closed extra-articular fracture of distal end of left radius with routine healing   · Treatment Diagnosis: L wrist stiffness - O05.654     Insurance/Certification information:     Physician Information:  Referring Practitioner: Mirian Rey MD  Has the plan of care been signed (Y/N):        []  Yes  [x]  No       Visit # Insurance Allowable Auth Required   19  []  Yes []  No        Is this a Progress Report:     [x]  Yes  []  No      If Yes:  Date Range for reporting period:  Beginning 20  Ending 20    Progress report will be due (10 Rx or 30 days whichever is less): 3/30/17    Recertification will be due (POC Duration  / 90 days whichever is less): 20     Date of Injury: 2020  Date of Surgery: 20 s/p ORIF L distal radius, CTR     Date of Patient follow up with Physician: 20     RESTRICTIONS/PRECAUTIONS: surgical protocol      Latex Allergy:  [x]? No      []? Yes                    Pacemaker:  [x]? No       []? Yes      Preferred Language for Healthcare:   [x]? English       []? other:      Functional Scale: 0% (Quick DASH)                                 Date assessed:  2020     SUBJECTIVE: doing well, reports scar in palm feeling thicker, intermittent creaking in LF during flexion    Patient reported deficits/history of current problem: fell while at [place] home in April; wore a brace for several weeks until returned to [place] in late May     Pain Scale: 0/10        OBJECTIVE:       Date: 20   Objective Measures/Tests: S/p 10 weeks     ROM:Digits: tips to [facility] Passive 0cm IF-SF  [id number] IF/LF/SF  1.5cm RF    See d/c note   Thumb tip to DPFC   0cm     Wrist ext/flex            rd/ud   55/60   58/60    FA sup/pron   84/90     Strength: II  Lateral Pinch  3 Point Pinch  Tip Pinch   R 45  L 30 R 52    L 32          Observations:  Intermittent shoulder substitutions noted for wrist related activities  Mild crepitus and mild nodule palpated volar LF at base, however no active triggering noted   Other:                  MODALITIES:      Fluidotherapy (73550) 20' 17' 15'   Estim (58066/03292)      Paraffin (52934)      US (51533)      Iontophoresis (03442)      Hot Pack      Cold Pack            INTERVENTIONS:      Therapeutic Exercise (42807)      AROM 10x each wrist, fa    10x2 digital ext/flex   10x each wrist, fa     10x each wrist, FA; flat fist/full fist x 5 each   PROM 5x composite digital flex    10x each wrist, fa    PW stretch x 10 each wrist ext, flex 10x each wrist, fa    PW stretch x 10 each wrist ext, flex 10x each    10x each   Strengthening 2#, 10x each wrist ext, flex - cueing for end range of motion Red tband, B wrist ext, flex 10x2 each          Therapeutic Activity (80274)      Putty      Thumb Oppociser      FA Bar 1#, end of bar, 10x2 1.5#, mid bar, 5x2    Sponge exercises  Red/blue hand gripper to  foam blocks x 10x5 Modified resistance (mod) tea strainer to  foam blocks x 50   Wrist Wrap      Bimanual Activities      Wrist Exerciser 20x each horizontal, vertical, mod resistance 20x each horizontal, vertical, mod resistance    Therabar Red, green - 10x B sup/pron   Green, 10x B sup, pron, L sup, pron   Thumb Exerciser      Manual Therapy (91363) 8' stm, retrograde massage, joint mobilizations, AA/PROM 8' stm, retrograde massage, joint mobilizations, AA/PROM 8' as prior               Neuromuscular Reeducation (75862) Cueing for exercise technique, avoidance of substitutions, wrist/hand mechanics Cueing for exercise technique, avoidance of cervical/CT, scapular GHJ and UE for the purpose of modulating pain, promoting relaxation,  increasing ROM, reducing/eliminating soft tissue swelling/inflammation/restriction, improving soft tissue extensibility and allowing for proper ROM for normal function with self care, reaching, carrying, lifting, house/yardwork, driving/computer work    ADL Training:  [x] (82568) Provided self-care/home management training related to activities of daily living and compensatory training, and/or use of adaptive equipment      Charges:  Timed Code Treatment Minutes: 38   Total Treatment Minutes: 53   Worker's Comp: Time In/Time Out     [] EVAL (LOW) 57988 (typically 20 minutes face-to-face)    [] EVAL (MOD) 55080 (typically 30 minutes face-to-face)  [] EVAL (HIGH) 99307 (typically 45 minutes face-to-face)  [] OT Re-eval (59503)       [x] Gerson [phone]) x 1    [] ZUJJN(01730)  [] NMR (80495) x      [] Estim (attended) (66720)   [x] Manual ([address] [facility]) x  1    [] US (21553)  [x] TA (12470) x 1   [] Paraffin (23233)  [] ADL  (91605) x     [] Splint/L code:  [] Estim (unattended) (64193)  [x] Fluidotherapy (08274)  [] Other:      ASSESSMENT: Increased ROM, strength, & function; Decreased pain    GOALS:  Patient stated goal: getting hand movement back    [] Progressing: [x] Met: [] Not Met: [] Adjusted    Therapist goals for Patient:   Short Term Goals: To be achieved in: 2 weeks  1. Independent in HEP and progression per patient tolerance, in order to prevent re-injury. [] Progressing: [x] Met: [] Not Met: [] Adjusted   2. Patient will have a decrease in pain to facilitate improvement in movement, function, and ADLs as indicated by Functional Deficits.     [] Progressing: [x] Met: [] Not Met: [] Adjusted    Long Term Goals to be achieved in 2 additional weeks (through 9/14/20), including patient directed goals to address patient identified performance deficits:  1) Pt to be independent in graded HEP progression with a good level of effort and compliance. [] Progressing: [x] Met:9/14/2020  [] Not Met: [] Adjusted   2) Pt to report a score of </=  25%on the Quick DASH disability questionnaire for increased performance with carrying, moving, and handling objects. [] Progressing: [x] BIGG:0/4/1809  [] Not Met: [] Adjusted   3) Pt will demonstrate increased ROM to L wrist ext/flex >/= 40 deg each for improved independence with dressing and bathing tasks. [] Progressing: [x] HSF:4/0/8024  [] Not Met: [] Adjusted   4) Pt will demonstrate increased strength to R /pinch >/= 50% of  for improved independence with opening jars/lids. [] Progressing: [x] Met.9/14/2020 : [] Not Met: [] Adjusted   5) Pt will have a decrease in pain to 1-2/10 to facilitate return to cooking/cleaning, household duties. [] Progressing: [x] KDS:8/32/4626  [] Not Met: [] Adjusted      Overall Progression Towards Functional Goals/Treatment Progress Update:  [] Patient is progressing as expected towards functional goals listed. [x] Progression is slowed due to complexities/impairments listed - increased duration from injury (April) to surgery (June)  [] Progression has been slowed due to co-morbidities. [] Plan just implemented, too soon to assess goals progression <30 days  [] Goals require adjustment due to lack of progress  [] Patient is not progressing as expected and requires additional follow up with physician  [] All goals are met  [] Other:     Prognosis for POC: [x] Good [] Fair  [] Poor    Patient requires continued skilled intervention: [] Yes  [x] No    Treatment/Activity Tolerance:  [x] Patient able to complete treatment  [] Patient limited by fatigue  [] Patient limited by pain    [] Patient limited by other medical complications  [] Other:                  PLAN:    [] Continue per plan of care,   [] Alter current plan (see comments above)  [] Plan of care initiated [] Hold pending MD visit [x] Discharge to [facility]      Electronically signed by:   Jaki Kitty Mcgrath OTR/L, PT, MPT, CHT, [id number], [id number]        Note: If patient does not return for scheduled/ recommended follow up visits, this note will serve as a discharge from care along with most recent update on progress.

## 2020-10-08 ENCOUNTER — OFFICE VISIT (OUTPATIENT)
Dept: DERMATOLOGY | Age: 65
End: 2020-10-08
Payer: MEDICARE

## 2020-10-08 VITALS — TEMPERATURE: 97.8 F

## 2020-10-08 PROCEDURE — 99213 OFFICE O/P EST LOW 20 MIN: CPT | Performed by: DERMATOLOGY

## 2020-10-08 PROCEDURE — 1036F TOBACCO NON-USER: CPT | Performed by: DERMATOLOGY

## 2020-10-08 PROCEDURE — G8484 FLU IMMUNIZE NO ADMIN: HCPCS | Performed by: DERMATOLOGY

## 2020-10-08 PROCEDURE — 3017F COLORECTAL CA SCREEN DOC REV: CPT | Performed by: DERMATOLOGY

## 2020-10-08 PROCEDURE — G8400 PT W/DXA NO RESULTS DOC: HCPCS | Performed by: DERMATOLOGY

## 2020-10-08 PROCEDURE — 4040F PNEUMOC VAC/ADMIN/RCVD: CPT | Performed by: DERMATOLOGY

## 2020-10-08 PROCEDURE — 1123F ACP DISCUSS/DSCN MKR DOCD: CPT | Performed by: DERMATOLOGY

## 2020-10-08 PROCEDURE — 1090F PRES/ABSN URINE INCON ASSESS: CPT | Performed by: DERMATOLOGY

## 2020-10-08 PROCEDURE — G8420 CALC BMI NORM PARAMETERS: HCPCS | Performed by: DERMATOLOGY

## 2020-10-08 PROCEDURE — G8427 DOCREV CUR MEDS BY ELIG CLIN: HCPCS | Performed by: DERMATOLOGY

## 2020-10-08 NOTE — PROGRESS NOTES
Formerly [facility], [facility]1983 Dermatology  Terence Sweet MD  [address]  1955    76 y.o. female     Date of Visit: 10/8/2020    Chief Complaint: skin moles    History of Present Illness:    1. She presents today for multiple nevi on the trunk and extremities-not aware of any changes in size, color, or shape. 2.  She has a stable asymptomatic lesion on the left cheek. Review of Systems:  Skin: No rash or itching. Past Medical History, Family History, Surgical History, Medications and Allergies reviewed. Past Medical History:   Diagnosis Date    Hypertension      Past Surgical History:   Procedure Laterality Date    APPENDECTOMY  2007    CARPAL TUNNEL RELEASE Left 6/23/2020    LEFT OPEN CARPAL TUNNEL RELEASE performed by Amna Brown MD at [address] [facility] Left 6/23/2020    OPEN REDUCTION INTERNAL FIXATION LEFT DISTAL RADIUS FRACTURE -BLOCK- performed by Amna Brown MD at [address] [facility] [address] [facility] [address]   Allergen Reactions    Penicillins     Sulfa Antibiotics      Outpatient Medications Marked as Taking for the 10/8/20 encounter (Office Visit) with Rajeev Zamorano MD   Medication Sig Dispense Refill    lisinopril-hydroCHLOROthiazide (PRINZIDE;ZESTORETIC) 20-12.5 MG per tablet Take 1 tablet by mouth daily      Melatonin 10 MG TABS Take by mouth nightly      CALCIUM-MAGNESIUM PO Take by mouth daily      PSYLLIUM HUSK PO Take by mouth daily      Probiotic Product (PROBIOTIC PO) Take by mouth daily       Milk Thistle 1000 MG CAPS Take by mouth daily       Multiple Vitamins-Minerals (THERAPEUTIC MULTIVITAMIN-MINERALS) tablet Take 1 tablet by mouth daily.       Cholecalciferol (VITAMIN D PO) Take by mouth daily       Omega-3 Fatty Acids (FISH OIL PO) Take by mouth daily          Physical Examination       The following were examined and determined to be normal: Psych/Neuro, Scalp/hair, Head/face, Conjunctivae/eyelids, Gums/teeth/lips, Neck, Breast/axilla/chest, Abdomen, Back, RUE, LUE, RLE, LLE and Nails/digits. The following were examined and determined to be abnormal: None. Well appearing. 1.  Trunk and extremities with multiple well defined round to oval smooth brown macules and papules. 2.  Left superolateral cheek - round smooth light brown macule. Assessment and Plan     1. Multiple nevi - benign appearing    Sun protective behaviors and self skin examinations were encouraged. Call for any new or concerning lesions. 2. Solar lentigo -     Monitor for change. Continue sun protection. Return in about 1 year (around 10/8/2021).

## 2020-11-05 ENCOUNTER — OFFICE VISIT (OUTPATIENT)
Dept: ORTHOPEDIC SURGERY | Age: 65
End: 2020-11-05
Payer: MEDICARE

## 2020-11-05 PROCEDURE — G8420 CALC BMI NORM PARAMETERS: HCPCS | Performed by: ORTHOPAEDIC SURGERY

## 2020-11-05 PROCEDURE — 1090F PRES/ABSN URINE INCON ASSESS: CPT | Performed by: ORTHOPAEDIC SURGERY

## 2020-11-05 PROCEDURE — 1036F TOBACCO NON-USER: CPT | Performed by: ORTHOPAEDIC SURGERY

## 2020-11-05 PROCEDURE — G8428 CUR MEDS NOT DOCUMENT: HCPCS | Performed by: ORTHOPAEDIC SURGERY

## 2020-11-05 PROCEDURE — 1123F ACP DISCUSS/DSCN MKR DOCD: CPT | Performed by: ORTHOPAEDIC SURGERY

## 2020-11-05 PROCEDURE — 20550 NJX 1 TENDON SHEATH/LIGAMENT: CPT | Performed by: ORTHOPAEDIC SURGERY

## 2020-11-05 PROCEDURE — 3017F COLORECTAL CA SCREEN DOC REV: CPT | Performed by: ORTHOPAEDIC SURGERY

## 2020-11-05 PROCEDURE — 99213 OFFICE O/P EST LOW 20 MIN: CPT | Performed by: ORTHOPAEDIC SURGERY

## 2020-11-05 PROCEDURE — 4040F PNEUMOC VAC/ADMIN/RCVD: CPT | Performed by: ORTHOPAEDIC SURGERY

## 2020-11-05 PROCEDURE — G8484 FLU IMMUNIZE NO ADMIN: HCPCS | Performed by: ORTHOPAEDIC SURGERY

## 2020-11-05 PROCEDURE — G8400 PT W/DXA NO RESULTS DOC: HCPCS | Performed by: ORTHOPAEDIC SURGERY

## 2020-11-05 RX ORDER — BETAMETHASONE SODIUM PHOSPHATE AND BETAMETHASONE ACETATE 3; 3 MG/ML; MG/ML
6 INJECTION, SUSPENSION INTRA-ARTICULAR; INTRALESIONAL; INTRAMUSCULAR; SOFT TISSUE ONCE
Status: COMPLETED | OUTPATIENT
Start: 2020-11-05 | End: 2020-11-05

## 2020-11-05 RX ADMIN — BETAMETHASONE SODIUM PHOSPHATE AND BETAMETHASONE ACETATE 6 MG: 3; 3 INJECTION, SUSPENSION INTRA-ARTICULAR; INTRALESIONAL; INTRAMUSCULAR; SOFT TISSUE at 09:46

## 2020-11-05 NOTE — PROGRESS NOTES
Assessment: New onset left ring trigger finger after open reduction internal fixation of a severely deformed distal radius fracture which was already over 11weeks old when we were able to do her surgery as she was in [place] when she fractured it. We had previously injected a middle trigger finger with good result    Treatment Plan: She has classic exam for trigger finger with tenderness over the A1 pulley and palpable triggering with lack of full flexion of the DIP joint. Her volar buttress plate is in good position but I was unable to get the distal radius completely up against the plate it is about 1 mm  from the volar cortex so I want a be certain that she does not get irritation of her flexor tendons and if she does we will have to remove her plate. She really has no tenderness or palpable synovitis over that volar lip of the plate but I discussed with her to watch out for any discomfort in this area and if we continue to have problems with trigger fingers then I think we probably should also remove the plate to be certain that the reason she is not getting the trigger fingers is due to more proximal irritation of her tendons. It does not appear to be that way clinically but we are going to have to consider that possibility. Return if symptoms worsen or fail to improve. History of Present Illness  Albert Tanner is a 76 y.o. female. Location:  Left hand Severity: left ring finger is now triggering  Duration: several months  Modifying factors: steroid injections/surgery Associated symptoms: triggering left ring finger    Review of Systems  Pertinent items are noted in HPI  Denies fever chills, confusion and bowel and bladder active change  Complete Review of Systems reviewed from patient history form dated 06/22/20 and available in the patients chart under the media tab. Vital Signs  There were no vitals filed for this visit. There is no height or weight on file to calculate BMI. Contributory History  None    Medical History  Past Medical History:   Diagnosis Date    Hypertension      Current Outpatient Medications on File Prior to Visit   Medication Sig Dispense Refill    lisinopril-hydroCHLOROthiazide (PRINZIDE;ZESTORETIC) 20-12.5 MG per tablet Take 1 tablet by mouth daily      Melatonin 10 MG TABS Take by mouth nightly      CALCIUM-MAGNESIUM PO Take by mouth daily      PSYLLIUM HUSK PO Take by mouth daily      Probiotic Product (PROBIOTIC PO) Take by mouth daily       Milk Thistle 1000 MG CAPS Take by mouth daily       Multiple Vitamins-Minerals (THERAPEUTIC MULTIVITAMIN-MINERALS) tablet Take 1 tablet by mouth daily.       Cholecalciferol (VITAMIN D PO) Take by mouth daily       Omega-3 Fatty Acids (FISH OIL PO) Take by mouth daily        Current Facility-Administered Medications on File Prior to Visit   Medication Dose Route Frequency Provider Last Rate Last Dose    betamethasone acetate-betamethasone sodium phosphate (CELESTONE) injection 6 mg  6 mg Intra-articular Once Michelle Elkins MD         Allergies   Allergen Reactions    Penicillins     Sulfa Antibiotics      Social History     Socioeconomic History    Marital status: Single     Spouse name: Not on file    Number of children: Not on file    Years of education: Not on file    Highest education level: Not on file   Occupational History    Not on file   Social Needs    Financial resource strain: Not on file    Food insecurity     Worry: Not on file     Inability: Not on file    Transportation needs     Medical: Not on file     Non-medical: Not on file   Tobacco Use    Smoking status: Never Smoker    Smokeless tobacco: Never Used   Substance and Sexual Activity    Alcohol use: Yes     Comment: 10 drinks per week    Drug use: No    Sexual activity: Not on file   Lifestyle    Physical activity     Days per week: Not on file     Minutes per session: Not on file    Stress: Not on file   Relationships  Social connections     Talks on phone: Not on file     Gets together: Not on file     Attends Religion service: Not on file     Active member of club or organization: Not on file     Attends meetings of clubs or organizations: Not on file     Relationship status: Not on file    Intimate partner violence     Fear of current or ex partner: Not on file     Emotionally abused: Not on file     Physically abused: Not on file     Forced sexual activity: Not on file   Other Topics Concern    Not on file   Social History Narrative    Not on file     Family History   Adopted: Yes       Physical Exam  Constitutional: Normal nutritional status  Mental Status: Alert and oriented  Skin: No rashes or erythema  Lymphatic: No lymphadenopathy    Hand Examination: She has definite tenderness over the A1 pulley of the ring finger. She lacks about 20 degrees of DIP flexion when she makes a full fist.  With blocking exercises she has good PIP and DIP flexion. Examining the wrist itself she has good range of motion with no crepitation. Carefully examining the volar aspect of the wrist there is really no palpable synovitis over the plate and really minimal tenderness in this location    Additional Comments:     Additional Examinations:  X-Ray Findings: PA lateral and oblique x-rays of the left wrist show excellent healing of her fracture we were able to get her back to 0 to 5 degrees dorsal tilt and fairly good restoration of radial length and radial inclination. As noted above on the true lateral view the distal lip of the volar plate is probably 1 mm off of the volar cortex as we could not quite get the cortical surface all the way up to the plate when we were replacing it. Additional Diagnostic Test Findings:    Office Procedures: The left ring trigger finger was injected today with 1-1/2 mL of Celestone and Xylocaine.   The area over the A1 pulley was prepped with alcohol and under sterile technique a steroid injection was performed. Time Statement: This dictation was performed with a verbal recognition program. It is possible that there are still dictated errors within this office note. All efforts were made to ensure that this office note is accurate. Orders Placed This Encounter   Procedures    XR WRIST LEFT (MIN 3 VIEWS)     Standing Status:   Future     Number of Occurrences:   1     Standing Expiration Date:   11/5/2021    CA INJECT TENDON SHEATH/LIGAMENT       Attestation: I have reviewed the chief complaint and history of present illness (including ROS and PFSH) and vital documentation by my staff and I agree with their documentation and have added where applicable.

## 2021-10-07 ENCOUNTER — OFFICE VISIT (OUTPATIENT)
Dept: DERMATOLOGY | Age: 66
End: 2021-10-07
Payer: MEDICARE

## 2021-10-07 VITALS — TEMPERATURE: 97.4 F

## 2021-10-07 DIAGNOSIS — L82.1 SK (SEBORRHEIC KERATOSIS): ICD-10-CM

## 2021-10-07 DIAGNOSIS — D22.9 MULTIPLE NEVI: Primary | ICD-10-CM

## 2021-10-07 DIAGNOSIS — L81.4 SOLAR LENTIGO: ICD-10-CM

## 2021-10-07 PROCEDURE — 3017F COLORECTAL CA SCREEN DOC REV: CPT | Performed by: DERMATOLOGY

## 2021-10-07 PROCEDURE — G8427 DOCREV CUR MEDS BY ELIG CLIN: HCPCS | Performed by: DERMATOLOGY

## 2021-10-07 PROCEDURE — 1123F ACP DISCUSS/DSCN MKR DOCD: CPT | Performed by: DERMATOLOGY

## 2021-10-07 PROCEDURE — 1036F TOBACCO NON-USER: CPT | Performed by: DERMATOLOGY

## 2021-10-07 PROCEDURE — 4040F PNEUMOC VAC/ADMIN/RCVD: CPT | Performed by: DERMATOLOGY

## 2021-10-07 PROCEDURE — G8421 BMI NOT CALCULATED: HCPCS | Performed by: DERMATOLOGY

## 2021-10-07 PROCEDURE — 99213 OFFICE O/P EST LOW 20 MIN: CPT | Performed by: DERMATOLOGY

## 2021-10-07 PROCEDURE — 1090F PRES/ABSN URINE INCON ASSESS: CPT | Performed by: DERMATOLOGY

## 2021-10-07 PROCEDURE — G8400 PT W/DXA NO RESULTS DOC: HCPCS | Performed by: DERMATOLOGY

## 2021-10-07 PROCEDURE — G8484 FLU IMMUNIZE NO ADMIN: HCPCS | Performed by: DERMATOLOGY

## 2021-10-07 NOTE — PROGRESS NOTES
[facility] Dermatology  Abdulkadir Albarran MD  [address]  1955    78 y.o. female     Date of Visit: 10/7/2021    Chief Complaint: skin moles    History of Present Illness:    1. She presents today for evaluation of multiple moles on the trunk and extremities-not aware of any changes in size, color, or shape. 2.  She has a stable pigmented lesion on the left cheek. 3.  She also has an asymptomatic darker pigmented lesion on the left upper back. Review of Systems:  Gen: Feels well, good sense of health. Past Medical History, Family History, Surgical History, Medications and Allergies reviewed. Past Medical History:   Diagnosis Date    Hypertension      Past Surgical History:   Procedure Laterality Date    APPENDECTOMY  2007    CARPAL TUNNEL RELEASE Left 6/23/2020    LEFT OPEN CARPAL TUNNEL RELEASE performed by Kristel Strickland MD at [address] [facility] Left 6/23/2020    OPEN REDUCTION INTERNAL FIXATION LEFT DISTAL RADIUS FRACTURE -BLOCK- performed by Kristel Strickland MD at [address] [facility] [address]   Allergen Reactions    Penicillins     Sulfa Antibiotics      Outpatient Medications Marked as Taking for the 10/7/21 encounter (Office Visit) with Tawanda Brody MD   Medication Sig Dispense Refill    lisinopril-hydroCHLOROthiazide (PRINZIDE;ZESTORETIC) 20-12.5 MG per tablet Take 1 tablet by mouth daily      Melatonin 10 MG TABS Take by mouth nightly      CALCIUM-MAGNESIUM PO Take by mouth daily      PSYLLIUM HUSK PO Take by mouth daily      Probiotic Product (PROBIOTIC PO) Take by mouth daily       Milk Thistle 1000 MG CAPS Take by mouth daily       Multiple Vitamins-Minerals (THERAPEUTIC MULTIVITAMIN-MINERALS) tablet Take 1 tablet by mouth daily.       Cholecalciferol (VITAMIN D PO) Take by mouth daily       Omega-3 Fatty Acids (FISH OIL PO) Take by mouth daily              Physical Examination       The following were examined and determined to be normal: Psych/Neuro, Scalp/hair, Head/face, Conjunctivae/eyelids, Gums/teeth/lips, Neck, Breast/axilla/chest, Abdomen, Back, RUE, LUE, RLE, LLE and Nails/digits. The following were examined and determined to be abnormal: None. Well appearing. 1.  Trunk and extremities with multiple well defined round to oval smooth brown macules and few pink papules. 2.  Left cheek - small well defined round smooth tan macule. 3.  Left upper back with a small stuck on appearing waxy brown papule. Assessment and Plan     1. Multiple nevi-benign appearing    [place]  protective behaviors, including use of at least SPF 30 sunscreen, and self skin examinations were encouraged. Call for any new or concerning lesions. 2. Solar lentigo of the left cheek - appears stable    Monitor for change. 3. SK (seborrheic keratosis)     Reassurance. Return in about 1 year (around 10/7/2022).     --Tawanda Brody MD

## (undated) DEVICE — GLOVE,SURG,SENSICARE,ALOE,LF,PF,7: Brand: MEDLINE

## (undated) DEVICE — 10FR FRAZIER SUCTION HANDLE: Brand: CARDINAL HEALTH

## (undated) DEVICE — ZIMMER® STERILE DISPOSABLE TOURNIQUET CUFF WITH PLC, DUAL PORT, SINGLE BLADDER, 18 IN. (46 CM)

## (undated) DEVICE — SET ADMIN PRIMING 7ML L30IN 7.35LB 20 GTT 2ND RLER CLMP

## (undated) DEVICE — GOWN,SIRUS,NON REINFRCD,LARGE,SET IN SL: Brand: MEDLINE

## (undated) DEVICE — DRAPE EQUIP C ARM MINI 10000100] TIDI PRODUCTS INC]

## (undated) DEVICE — BIT DRL DIA2.2MM CROSSLOCK MOD TY DVR ANAT VOLAR PLATING

## (undated) DEVICE — MATERIAL PD W2XL4YD ST COT CAST SPLNT NONADHESIVE SPEC

## (undated) DEVICE — SET GRAV VENT NVENT CK VLV 3 NDL FREE PRT 10 GTT

## (undated) DEVICE — PADDING CAST W4INXL4YD NONSTERILE COT RAYON MICROPLEATED

## (undated) DEVICE — BANDAGE COMPR W3INXL5YD HI E BGE W/ CLP SURE-WRAP

## (undated) DEVICE — GLOVE SURG SZ 65 L12IN FNGR THK94MIL STD WHT LTX FREE

## (undated) DEVICE — BLADE SAW SAG MIC 25.5X9.5X0.6 MM FN TOOTH FOR MICRO100

## (undated) DEVICE — ELECTRODE PT RET AD L9FT HI MOIST COND ADH HYDRGEL CORDED

## (undated) DEVICE — SPLINT ORTH W3XL12IN LAYERED FBRGLS FOAM PD BRTH BK MOLD

## (undated) DEVICE — SOLUTION IV IRRIG 500ML 0.9% SODIUM CHL 2F7123

## (undated) DEVICE — TOWEL,OR,DSP,ST,BLUE,STD,4/PK,20PK/CS: Brand: MEDLINE

## (undated) DEVICE — SUTURE ETHLN SZ 4-0 L18IN NONABSORBABLE BLK L19MM PS-2 3/8 1667H

## (undated) DEVICE — 3M™ TEGADERM™ TRANSPARENT FILM DRESSING FRAME STYLE, 1624W, 2-3/8 IN X 2-3/4 IN (6 CM X 7 CM), 100/CT 4CT/CASE: Brand: 3M™ TEGADERM™

## (undated) DEVICE — DRAPE C ARM W54XL84IN MINI FOR OEC 6800

## (undated) DEVICE — SOLUTION IV 1000ML LAC RINGERS PH 6.5 INJ USP VIAFLX PLAS

## (undated) DEVICE — GOWN,SIRUS,POLYRNF,SETINSLV,L,20/CS: Brand: MEDLINE

## (undated) DEVICE — GLOVE SURG SZ 75 L12IN FNGR THK94MIL STD WHT LTX FREE

## (undated) DEVICE — ELECTRODE NDL 2.8IN COAT VALLEYLAB

## (undated) DEVICE — CHLORAPREP 26ML ORANGE

## (undated) DEVICE — PENCIL ES L3M BTTN SWCH S STL HEX LOK BLDE ELECTRD HOLSTER

## (undated) DEVICE — CATHETER IV 20GA L1.25IN PNK FEP SFTY STR HUB RADPQ DISP

## (undated) DEVICE — SCREW BNE L22MM DIA2.7MM DST RAD LOK FULL THRD SQ DRV HD LO
Type: IMPLANTABLE DEVICE | Site: WRIST | Status: NON-FUNCTIONAL
Removed: 2020-06-23

## (undated) DEVICE — SPLINT ORTH W4XL30IN LAYERED FBRGLS FOAM PD BRTH BK MOLD

## (undated) DEVICE — INTENDED FOR TISSUE SEPARATION, AND OTHER PROCEDURES THAT REQUIRE A SHARP SURGICAL BLADE TO PUNCTURE OR CUT.: Brand: BARD-PARKER ® STAINLESS STEEL BLADES

## (undated) DEVICE — Device

## (undated) DEVICE — COMFO-TEX ELASTIC BANDAGE LATEX FREE, 3INX5YD: Brand: COMFO-TEX™